# Patient Record
Sex: MALE | Race: WHITE | NOT HISPANIC OR LATINO | Employment: STUDENT | URBAN - METROPOLITAN AREA
[De-identification: names, ages, dates, MRNs, and addresses within clinical notes are randomized per-mention and may not be internally consistent; named-entity substitution may affect disease eponyms.]

---
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
## 2021-03-23 ENCOUNTER — HISTORICAL (OUTPATIENT)
Dept: ADMINISTRATIVE | Facility: HOSPITAL | Age: 21
End: 2021-03-23

## 2021-03-23 LAB
ABS NEUT (OLG): 3.23 X10(3)/MCL (ref 2.1–9.2)
ALBUMIN SERPL-MCNC: 4.6 GM/DL (ref 3.5–5)
ALBUMIN/GLOB SERPL: 2.2 RATIO (ref 1.1–2)
ALP SERPL-CCNC: 72 UNIT/L
ALT SERPL-CCNC: 16 UNIT/L (ref 0–55)
AST SERPL-CCNC: 18 UNIT/L (ref 5–34)
BASOPHILS # BLD AUTO: 0 X10(3)/MCL (ref 0–0.2)
BASOPHILS NFR BLD AUTO: 0 %
BILIRUB SERPL-MCNC: 1.5 MG/DL
BILIRUBIN DIRECT+TOT PNL SERPL-MCNC: 0.6 MG/DL (ref 0–0.5)
BILIRUBIN DIRECT+TOT PNL SERPL-MCNC: 0.9 MG/DL (ref 0–0.8)
BUN SERPL-MCNC: 10.1 MG/DL (ref 8.9–20.6)
CALCIUM SERPL-MCNC: 9.2 MG/DL (ref 8.4–10.2)
CHLORIDE SERPL-SCNC: 106 MMOL/L (ref 98–107)
CHOLEST SERPL-MCNC: 120 MG/DL
CHOLEST/HDLC SERPL: 3 {RATIO} (ref 0–5)
CO2 SERPL-SCNC: 30 MMOL/L (ref 22–29)
CREAT SERPL-MCNC: 0.83 MG/DL (ref 0.73–1.18)
EOSINOPHIL # BLD AUTO: 0.2 X10(3)/MCL (ref 0–0.9)
EOSINOPHIL NFR BLD AUTO: 3 %
ERYTHROCYTE [DISTWIDTH] IN BLOOD BY AUTOMATED COUNT: 11.8 % (ref 11.5–17)
EST. AVERAGE GLUCOSE BLD GHB EST-MCNC: 96.8 MG/DL
GLOBULIN SER-MCNC: 2.1 GM/DL (ref 2.4–3.5)
GLUCOSE SERPL-MCNC: 95 MG/DL (ref 74–100)
HBA1C MFR BLD: 5 %
HCT VFR BLD AUTO: 45.3 % (ref 42–52)
HDLC SERPL-MCNC: 46 MG/DL (ref 35–60)
HGB BLD-MCNC: 15.4 GM/DL (ref 14–18)
LDLC SERPL CALC-MCNC: 65 MG/DL (ref 50–140)
LYMPHOCYTES # BLD AUTO: 1.9 X10(3)/MCL (ref 0.6–4.6)
LYMPHOCYTES NFR BLD AUTO: 33 %
MCH RBC QN AUTO: 30.6 PG (ref 27–31)
MCHC RBC AUTO-ENTMCNC: 34 GM/DL (ref 33–36)
MCV RBC AUTO: 90.1 FL (ref 80–94)
MONOCYTES # BLD AUTO: 0.4 X10(3)/MCL (ref 0.1–1.3)
MONOCYTES NFR BLD AUTO: 7 %
NEUTROPHILS # BLD AUTO: 3.23 X10(3)/MCL (ref 2.1–9.2)
NEUTROPHILS NFR BLD AUTO: 56 %
PLATELET # BLD AUTO: 194 X10(3)/MCL (ref 130–400)
PMV BLD AUTO: 9.9 FL (ref 9.4–12.4)
POTASSIUM SERPL-SCNC: 4.6 MMOL/L (ref 3.5–5.1)
PROT SERPL-MCNC: 6.7 GM/DL (ref 6.4–8.3)
RBC # BLD AUTO: 5.03 X10(6)/MCL (ref 4.7–6.1)
SODIUM SERPL-SCNC: 141 MMOL/L (ref 136–145)
TRIGL SERPL-MCNC: 45 MG/DL (ref 34–140)
TSH SERPL-ACNC: 1.2 UIU/ML (ref 0.35–4.94)
VLDLC SERPL CALC-MCNC: 9 MG/DL
WBC # SPEC AUTO: 5.8 X10(3)/MCL (ref 4.5–11.5)

## 2022-04-11 ENCOUNTER — HISTORICAL (OUTPATIENT)
Dept: ADMINISTRATIVE | Facility: HOSPITAL | Age: 22
End: 2022-04-11
Payer: COMMERCIAL

## 2022-04-28 VITALS
WEIGHT: 171.94 LBS | SYSTOLIC BLOOD PRESSURE: 124 MMHG | DIASTOLIC BLOOD PRESSURE: 84 MMHG | HEIGHT: 72 IN | BODY MASS INDEX: 23.29 KG/M2 | OXYGEN SATURATION: 98 %

## 2022-05-16 ENCOUNTER — OFFICE VISIT (OUTPATIENT)
Dept: INTERNAL MEDICINE | Facility: CLINIC | Age: 22
End: 2022-05-16
Payer: COMMERCIAL

## 2022-05-16 VITALS
HEIGHT: 71 IN | SYSTOLIC BLOOD PRESSURE: 138 MMHG | HEART RATE: 71 BPM | BODY MASS INDEX: 25.09 KG/M2 | OXYGEN SATURATION: 98 % | TEMPERATURE: 98 F | DIASTOLIC BLOOD PRESSURE: 80 MMHG | WEIGHT: 179.19 LBS

## 2022-05-16 DIAGNOSIS — B36.0 TINEA VERSICOLOR: ICD-10-CM

## 2022-05-16 DIAGNOSIS — F90.0 ATTENTION DEFICIT HYPERACTIVITY DISORDER (ADHD), PREDOMINANTLY INATTENTIVE TYPE: Primary | ICD-10-CM

## 2022-05-16 DIAGNOSIS — F41.1 ANXIETY STATE: ICD-10-CM

## 2022-05-16 PROBLEM — E80.6 HYPERBILIRUBINEMIA: Status: ACTIVE | Noted: 2022-05-16

## 2022-05-16 PROBLEM — M54.50 LOW BACK PAIN: Status: ACTIVE | Noted: 2022-05-16

## 2022-05-16 PROBLEM — F90.9 ATTENTION DEFICIT HYPERACTIVITY DISORDER (ADHD): Status: ACTIVE | Noted: 2022-05-16

## 2022-05-16 PROCEDURE — 3008F BODY MASS INDEX DOCD: CPT | Mod: CPTII,,, | Performed by: INTERNAL MEDICINE

## 2022-05-16 PROCEDURE — 3075F SYST BP GE 130 - 139MM HG: CPT | Mod: CPTII,,, | Performed by: INTERNAL MEDICINE

## 2022-05-16 PROCEDURE — 1159F MED LIST DOCD IN RCRD: CPT | Mod: CPTII,,, | Performed by: INTERNAL MEDICINE

## 2022-05-16 PROCEDURE — 1160F RVW MEDS BY RX/DR IN RCRD: CPT | Mod: CPTII,,, | Performed by: INTERNAL MEDICINE

## 2022-05-16 PROCEDURE — 3079F DIAST BP 80-89 MM HG: CPT | Mod: CPTII,,, | Performed by: INTERNAL MEDICINE

## 2022-05-16 PROCEDURE — 3075F PR MOST RECENT SYSTOLIC BLOOD PRESS GE 130-139MM HG: ICD-10-PCS | Mod: CPTII,,, | Performed by: INTERNAL MEDICINE

## 2022-05-16 PROCEDURE — 99213 PR OFFICE/OUTPT VISIT, EST, LEVL III, 20-29 MIN: ICD-10-PCS | Mod: ,,, | Performed by: INTERNAL MEDICINE

## 2022-05-16 PROCEDURE — 1160F PR REVIEW ALL MEDS BY PRESCRIBER/CLIN PHARMACIST DOCUMENTED: ICD-10-PCS | Mod: CPTII,,, | Performed by: INTERNAL MEDICINE

## 2022-05-16 PROCEDURE — 1159F PR MEDICATION LIST DOCUMENTED IN MEDICAL RECORD: ICD-10-PCS | Mod: CPTII,,, | Performed by: INTERNAL MEDICINE

## 2022-05-16 PROCEDURE — 3079F PR MOST RECENT DIASTOLIC BLOOD PRESSURE 80-89 MM HG: ICD-10-PCS | Mod: CPTII,,, | Performed by: INTERNAL MEDICINE

## 2022-05-16 PROCEDURE — 99213 OFFICE O/P EST LOW 20 MIN: CPT | Mod: ,,, | Performed by: INTERNAL MEDICINE

## 2022-05-16 PROCEDURE — 3008F PR BODY MASS INDEX (BMI) DOCUMENTED: ICD-10-PCS | Mod: CPTII,,, | Performed by: INTERNAL MEDICINE

## 2022-05-16 RX ORDER — KETOCONAZOLE 20 MG/G
CREAM TOPICAL DAILY
Qty: 60 G | Refills: 2 | Status: SHIPPED | OUTPATIENT
Start: 2022-05-16 | End: 2022-08-16

## 2022-05-16 RX ORDER — DEXTROAMPHETAMINE SACCHARATE, AMPHETAMINE ASPARTATE, DEXTROAMPHETAMINE SULFATE AND AMPHETAMINE SULFATE 2.5; 2.5; 2.5; 2.5 MG/1; MG/1; MG/1; MG/1
10 TABLET ORAL DAILY
COMMUNITY
Start: 2022-01-13 | End: 2022-06-03 | Stop reason: SDUPTHER

## 2022-05-16 RX ORDER — DEXTROAMPHETAMINE SACCHARATE, AMPHETAMINE ASPARTATE MONOHYDRATE, DEXTROAMPHETAMINE SULFATE AND AMPHETAMINE SULFATE 3.75; 3.75; 3.75; 3.75 MG/1; MG/1; MG/1; MG/1
15 CAPSULE, EXTENDED RELEASE ORAL DAILY
COMMUNITY
Start: 2021-06-17 | End: 2022-06-03 | Stop reason: SDUPTHER

## 2022-05-16 RX ORDER — KETOCONAZOLE 20 MG/ML
SHAMPOO, SUSPENSION TOPICAL
Qty: 120 ML | Refills: 3 | Status: SHIPPED | OUTPATIENT
Start: 2022-05-16 | End: 2022-08-16

## 2022-05-16 NOTE — PROGRESS NOTES
Subjective:      Patient ID: Trey Slaughter is a 21 y.o. male.    Chief Complaint: No chief complaint on file.    Trey is a 21-year-old male presented for his ADHD.  Follow-up.  Blood pressure is mildly elevated in the 140 systolic range.  He just had a couple coughing right before the visit.  Advised that we will go ahead and refill his 15 mg XL for the funding feeling his Adderall 10 mg tablets until his blood pressure check next week.  Patient has noted some variations in blood pressure again at home.  Advised to refrain from excessive caffeine intake.  S S    The patient's Health Maintenance was reviewed and the following appears to be due at this time:   Health Maintenance Due   Topic Date Due    Hepatitis C Screening  Never done    HIV Screening  Never done    HPV Vaccines (2 - Male 3-dose series) 06/12/2019    COVID-19 Vaccine (3 - Booster for Pfizer series) 03/15/2022        Past Medical History:  Past Medical History:   Diagnosis Date    Anxiety state     Attention-deficit hyperactivity disorder, unspecified type     Hyperbilirubinemia     Low back pain     Pityriasis versicolor      Past Surgical History:   Procedure Laterality Date    Shoulder joint operation N/A      Review of patient's allergies indicates:  Not on File  Social History     Socioeconomic History    Marital status: Single     Family History   Problem Relation Age of Onset    Hypertension Mother        Review of Systems   Constitutional: Negative.         Alert, Oriented and in no acute distress    HENT: Negative for congestion, rhinorrhea, sinus pressure, sinus pain, sore throat and voice change.    Eyes: Negative for discharge and visual disturbance.   Respiratory: Negative for cough, chest tightness, shortness of breath and wheezing.    Cardiovascular: Negative for chest pain, palpitations and leg swelling.   Gastrointestinal: Negative for abdominal distention, abdominal pain, constipation, diarrhea, nausea and  vomiting.   Endocrine: Negative for cold intolerance, heat intolerance, polydipsia and polyuria.   Genitourinary: Negative for dysuria, flank pain, frequency and hematuria.   Musculoskeletal: Negative for arthralgias, back pain, gait problem, joint swelling, myalgias and neck pain.   Skin: Negative for rash.   Allergic/Immunologic: Negative for immunocompromised state.   Neurological: Negative for dizziness, seizures, weakness and headaches.   Psychiatric/Behavioral: Negative for agitation and suicidal ideas. The patient is not nervous/anxious.        Objective:   There were no vitals taken for this visit.    Physical Exam  Constitutional:       Appearance: Normal appearance.   HENT:      Head: Normocephalic and atraumatic.      Nose: Nose normal.      Mouth/Throat:      Mouth: Mucous membranes are moist.      Pharynx: Oropharynx is clear.   Eyes:      Extraocular Movements: Extraocular movements intact.      Pupils: Pupils are equal, round, and reactive to light.   Cardiovascular:      Rate and Rhythm: Normal rate and regular rhythm.      Pulses: Normal pulses.   Pulmonary:      Effort: Pulmonary effort is normal.      Breath sounds: Normal breath sounds.   Abdominal:      General: Bowel sounds are normal.      Palpations: Abdomen is soft.   Musculoskeletal:         General: Normal range of motion.      Cervical back: Normal range of motion and neck supple.   Skin:     General: Skin is warm.   Neurological:      General: No focal deficit present.      Mental Status: He is alert and oriented to person, place, and time. Mental status is at baseline.   Psychiatric:         Mood and Affect: Mood normal.       Assessment/ Plan:   1. Attention deficit hyperactivity disorder (ADHD), predominantly inattentive type  . On Adderall, continue  - No weight loss, seems to be doing well on the current dose   - blood pressure was mildly elevated, patient is advised to cut back on his caffeine intake    2. Anxiety state  - on  propranolol       No orders of the defined types were placed in this encounter.      Medication List with Changes/Refills   Current Medications    DEXTROAMPHETAMINE-AMPHETAMINE (ADDERALL) 10 MG TAB    Take 10 mg by mouth Daily.    PROPRANOLOL (INNOPRAN XL) 80 MG 24 HR CAPSULE    Take 80 mg by mouth Daily.      Medication List with Changes/Refills   Current Medications    DEXTROAMPHETAMINE-AMPHETAMINE (ADDERALL) 10 MG TAB    Take 10 mg by mouth Daily.       Start Date: 1/13/2022 End Date: --    PROPRANOLOL (INNOPRAN XL) 80 MG 24 HR CAPSULE    Take 80 mg by mouth Daily.       Start Date: 9/27/2021 End Date: --

## 2022-05-23 NOTE — TELEPHONE ENCOUNTER
----- Message from Dima Wilkins sent at 2022 10:54 AM CDT -----  Regarding: REFILL  MEDICATION REFILL REQUEST      PATIENT PHONE #:  [phone]     :  2000     PHARMACY:  [facility] [address]     PHARMACY PHONE #:      ALLERGIES: NKDA     MESSAGE  REFILL     1.) ADDERAL ER 15 MG     2.) ADDERAL 10 MG

## 2022-06-03 DIAGNOSIS — F90.0 ATTENTION DEFICIT HYPERACTIVITY DISORDER (ADHD), PREDOMINANTLY INATTENTIVE TYPE: ICD-10-CM

## 2022-06-03 RX ORDER — DEXTROAMPHETAMINE SACCHARATE, AMPHETAMINE ASPARTATE MONOHYDRATE, DEXTROAMPHETAMINE SULFATE AND AMPHETAMINE SULFATE 3.75; 3.75; 3.75; 3.75 MG/1; MG/1; MG/1; MG/1
15 CAPSULE, EXTENDED RELEASE ORAL DAILY
Qty: 30 CAPSULE | Refills: 0 | Status: SHIPPED | OUTPATIENT
Start: 2022-06-03 | End: 2022-07-06 | Stop reason: SDUPTHER

## 2022-06-03 RX ORDER — DEXTROAMPHETAMINE SACCHARATE, AMPHETAMINE ASPARTATE, DEXTROAMPHETAMINE SULFATE AND AMPHETAMINE SULFATE 2.5; 2.5; 2.5; 2.5 MG/1; MG/1; MG/1; MG/1
10 TABLET ORAL DAILY
Qty: 30 TABLET | Refills: 0 | Status: SHIPPED | OUTPATIENT
Start: 2022-06-03 | End: 2022-07-06 | Stop reason: SDUPTHER

## 2022-06-03 NOTE — TELEPHONE ENCOUNTER
----- Message from Kim Porter sent at 6/3/2022  8:59 AM CDT -----  Regardinth call for refills  Patient said he has called 4 times to try to get refills.  He needs 15 mg adderall extended release and 10 mg adderall regular. He uses [facility] his contact number is [phone]

## 2022-07-06 DIAGNOSIS — F90.0 ATTENTION DEFICIT HYPERACTIVITY DISORDER (ADHD), PREDOMINANTLY INATTENTIVE TYPE: ICD-10-CM

## 2022-07-06 RX ORDER — DEXTROAMPHETAMINE SACCHARATE, AMPHETAMINE ASPARTATE, DEXTROAMPHETAMINE SULFATE AND AMPHETAMINE SULFATE 2.5; 2.5; 2.5; 2.5 MG/1; MG/1; MG/1; MG/1
10 TABLET ORAL DAILY
Qty: 30 TABLET | Refills: 0 | Status: SHIPPED | OUTPATIENT
Start: 2022-07-06 | End: 2022-08-08 | Stop reason: SDUPTHER

## 2022-07-06 RX ORDER — DEXTROAMPHETAMINE SACCHARATE, AMPHETAMINE ASPARTATE MONOHYDRATE, DEXTROAMPHETAMINE SULFATE AND AMPHETAMINE SULFATE 3.75; 3.75; 3.75; 3.75 MG/1; MG/1; MG/1; MG/1
15 CAPSULE, EXTENDED RELEASE ORAL DAILY
Qty: 30 CAPSULE | Refills: 0 | Status: SHIPPED | OUTPATIENT
Start: 2022-07-06 | End: 2022-08-08

## 2022-08-08 DIAGNOSIS — F90.0 ATTENTION DEFICIT HYPERACTIVITY DISORDER (ADHD), PREDOMINANTLY INATTENTIVE TYPE: ICD-10-CM

## 2022-08-08 DIAGNOSIS — Z00.00 WELLNESS EXAMINATION: Primary | ICD-10-CM

## 2022-08-08 RX ORDER — DEXTROAMPHETAMINE SACCHARATE, AMPHETAMINE ASPARTATE, DEXTROAMPHETAMINE SULFATE AND AMPHETAMINE SULFATE 2.5; 2.5; 2.5; 2.5 MG/1; MG/1; MG/1; MG/1
10 TABLET ORAL DAILY
Qty: 30 TABLET | Refills: 0 | Status: SHIPPED | OUTPATIENT
Start: 2022-08-08 | End: 2022-08-16 | Stop reason: SDUPTHER

## 2022-08-08 NOTE — TELEPHONE ENCOUNTER
----- Message from Chelsy Gaytan sent at 2022  9:57 AM CDT -----  Regarding: refill  MEDICATION REFILL REQUEST      PATIENT PHONE #:[phone]     :2000     PHARMACY:[facility]     PHARMACY PHONE #: [phone]     ALLERGIES:nka     MESSAGE      Adderoll 10mg   qd

## 2022-08-12 ENCOUNTER — LAB VISIT (OUTPATIENT)
Dept: LAB | Facility: HOSPITAL | Age: 22
End: 2022-08-12
Attending: INTERNAL MEDICINE
Payer: COMMERCIAL

## 2022-08-12 DIAGNOSIS — Z00.00 WELLNESS EXAMINATION: ICD-10-CM

## 2022-08-12 LAB
ALBUMIN SERPL-MCNC: 4.3 GM/DL (ref 3.5–5)
ALBUMIN/GLOB SERPL: 2 RATIO (ref 1.1–2)
ALP SERPL-CCNC: 56 UNIT/L (ref 40–150)
ALT SERPL-CCNC: 11 UNIT/L (ref 0–55)
AST SERPL-CCNC: 16 UNIT/L (ref 5–34)
BASOPHILS # BLD AUTO: 0.04 X10(3)/MCL (ref 0–0.2)
BASOPHILS NFR BLD AUTO: 0.8 %
BILIRUBIN DIRECT+TOT PNL SERPL-MCNC: 2.1 MG/DL
BUN SERPL-MCNC: 10.4 MG/DL (ref 8.9–20.6)
CALCIUM SERPL-MCNC: 9.6 MG/DL (ref 8.4–10.2)
CHLORIDE SERPL-SCNC: 108 MMOL/L (ref 98–107)
CHOLEST SERPL-MCNC: 111 MG/DL
CHOLEST/HDLC SERPL: 3 {RATIO} (ref 0–5)
CO2 SERPL-SCNC: 28 MMOL/L (ref 22–29)
CREAT SERPL-MCNC: 0.78 MG/DL (ref 0.73–1.18)
EOSINOPHIL # BLD AUTO: 0.13 X10(3)/MCL (ref 0–0.9)
EOSINOPHIL NFR BLD AUTO: 2.4 %
ERYTHROCYTE [DISTWIDTH] IN BLOOD BY AUTOMATED COUNT: 11.9 % (ref 11.5–17)
EST. AVERAGE GLUCOSE BLD GHB EST-MCNC: 88.2 MG/DL
GFR SERPLBLD CREATININE-BSD FMLA CKD-EPI: >60 MLS/MIN/1.73/M2
GLOBULIN SER-MCNC: 2.1 GM/DL (ref 2.4–3.5)
GLUCOSE SERPL-MCNC: 95 MG/DL (ref 74–100)
HBA1C MFR BLD: 4.7 %
HCT VFR BLD AUTO: 43.2 % (ref 42–52)
HDLC SERPL-MCNC: 38 MG/DL (ref 35–60)
HGB BLD-MCNC: 14.6 GM/DL (ref 14–18)
IMM GRANULOCYTES # BLD AUTO: 0.01 X10(3)/MCL (ref 0–0.04)
IMM GRANULOCYTES NFR BLD AUTO: 0.2 %
LDLC SERPL CALC-MCNC: 64 MG/DL (ref 50–140)
LYMPHOCYTES # BLD AUTO: 2.13 X10(3)/MCL (ref 0.6–4.6)
LYMPHOCYTES NFR BLD AUTO: 40 %
MCH RBC QN AUTO: 31 PG (ref 27–31)
MCHC RBC AUTO-ENTMCNC: 33.8 MG/DL (ref 33–36)
MCV RBC AUTO: 91.7 FL (ref 80–94)
MONOCYTES # BLD AUTO: 0.52 X10(3)/MCL (ref 0.1–1.3)
MONOCYTES NFR BLD AUTO: 9.8 %
NEUTROPHILS # BLD AUTO: 2.5 X10(3)/MCL (ref 2.1–9.2)
NEUTROPHILS NFR BLD AUTO: 46.8 %
NRBC BLD AUTO-RTO: 0 %
PLATELET # BLD AUTO: 205 X10(3)/MCL (ref 130–400)
PMV BLD AUTO: 9.9 FL (ref 7.4–10.4)
POTASSIUM SERPL-SCNC: 4.3 MMOL/L (ref 3.5–5.1)
PROT SERPL-MCNC: 6.4 GM/DL (ref 6.4–8.3)
RBC # BLD AUTO: 4.71 X10(6)/MCL (ref 4.7–6.1)
SODIUM SERPL-SCNC: 140 MMOL/L (ref 136–145)
TRIGL SERPL-MCNC: 45 MG/DL (ref 34–140)
TSH SERPL-ACNC: 1.41 UIU/ML (ref 0.35–4.94)
VLDLC SERPL CALC-MCNC: 9 MG/DL
WBC # SPEC AUTO: 5.3 X10(3)/MCL (ref 4.5–11.5)

## 2022-08-12 PROCEDURE — 83036 HEMOGLOBIN GLYCOSYLATED A1C: CPT

## 2022-08-12 PROCEDURE — 84443 ASSAY THYROID STIM HORMONE: CPT

## 2022-08-12 PROCEDURE — 80053 COMPREHEN METABOLIC PANEL: CPT

## 2022-08-12 PROCEDURE — 80061 LIPID PANEL: CPT

## 2022-08-12 PROCEDURE — 85025 COMPLETE CBC W/AUTO DIFF WBC: CPT

## 2022-08-12 PROCEDURE — 36415 COLL VENOUS BLD VENIPUNCTURE: CPT

## 2022-08-16 ENCOUNTER — OFFICE VISIT (OUTPATIENT)
Dept: INTERNAL MEDICINE | Facility: CLINIC | Age: 22
End: 2022-08-16
Payer: COMMERCIAL

## 2022-08-16 VITALS
BODY MASS INDEX: 24.45 KG/M2 | DIASTOLIC BLOOD PRESSURE: 68 MMHG | WEIGHT: 174.63 LBS | SYSTOLIC BLOOD PRESSURE: 124 MMHG | HEART RATE: 89 BPM | OXYGEN SATURATION: 98 % | TEMPERATURE: 99 F | HEIGHT: 71 IN

## 2022-08-16 DIAGNOSIS — Z00.00 WELLNESS EXAMINATION: ICD-10-CM

## 2022-08-16 DIAGNOSIS — F90.0 ATTENTION DEFICIT HYPERACTIVITY DISORDER (ADHD), PREDOMINANTLY INATTENTIVE TYPE: Primary | ICD-10-CM

## 2022-08-16 DIAGNOSIS — F41.1 ANXIETY STATE: ICD-10-CM

## 2022-08-16 PROCEDURE — 3078F DIAST BP <80 MM HG: CPT | Mod: CPTII,,, | Performed by: INTERNAL MEDICINE

## 2022-08-16 PROCEDURE — 3074F PR MOST RECENT SYSTOLIC BLOOD PRESSURE < 130 MM HG: ICD-10-PCS | Mod: CPTII,,, | Performed by: INTERNAL MEDICINE

## 2022-08-16 PROCEDURE — 1159F PR MEDICATION LIST DOCUMENTED IN MEDICAL RECORD: ICD-10-PCS | Mod: CPTII,,, | Performed by: INTERNAL MEDICINE

## 2022-08-16 PROCEDURE — 3008F BODY MASS INDEX DOCD: CPT | Mod: CPTII,,, | Performed by: INTERNAL MEDICINE

## 2022-08-16 PROCEDURE — 3078F PR MOST RECENT DIASTOLIC BLOOD PRESSURE < 80 MM HG: ICD-10-PCS | Mod: CPTII,,, | Performed by: INTERNAL MEDICINE

## 2022-08-16 PROCEDURE — 1159F MED LIST DOCD IN RCRD: CPT | Mod: CPTII,,, | Performed by: INTERNAL MEDICINE

## 2022-08-16 PROCEDURE — 3008F PR BODY MASS INDEX (BMI) DOCUMENTED: ICD-10-PCS | Mod: CPTII,,, | Performed by: INTERNAL MEDICINE

## 2022-08-16 PROCEDURE — 99395 PR PREVENTIVE VISIT,EST,18-39: ICD-10-PCS | Mod: ,,, | Performed by: INTERNAL MEDICINE

## 2022-08-16 PROCEDURE — 99395 PREV VISIT EST AGE 18-39: CPT | Mod: ,,, | Performed by: INTERNAL MEDICINE

## 2022-08-16 PROCEDURE — 3074F SYST BP LT 130 MM HG: CPT | Mod: CPTII,,, | Performed by: INTERNAL MEDICINE

## 2022-08-16 RX ORDER — DEXTROAMPHETAMINE SACCHARATE, AMPHETAMINE ASPARTATE, DEXTROAMPHETAMINE SULFATE AND AMPHETAMINE SULFATE 2.5; 2.5; 2.5; 2.5 MG/1; MG/1; MG/1; MG/1
10 TABLET ORAL DAILY
Qty: 30 TABLET | Refills: 0 | Status: SHIPPED | OUTPATIENT
Start: 2022-08-16 | End: 2022-08-16 | Stop reason: SDUPTHER

## 2022-08-16 RX ORDER — DEXTROAMPHETAMINE SACCHARATE, AMPHETAMINE ASPARTATE, DEXTROAMPHETAMINE SULFATE AND AMPHETAMINE SULFATE 2.5; 2.5; 2.5; 2.5 MG/1; MG/1; MG/1; MG/1
10 TABLET ORAL DAILY
Qty: 30 TABLET | Refills: 0 | Status: SHIPPED | OUTPATIENT
Start: 2022-08-16 | End: 2022-09-09 | Stop reason: SDUPTHER

## 2022-08-16 NOTE — ASSESSMENT & PLAN NOTE
-labs are reviewed all essentially normal  -patient is advised on importance of watching her carbohydrate intake and saturated fat intake, making the right nutritional choices and exercising on a regular basis  -up-to-date with the screening

## 2022-08-16 NOTE — PROGRESS NOTES
Subjective:      Patient ID: Trey Slaughter is a 21 y.o. male.    Chief Complaint: Annual Exam (Wellness)    Trey is a 21-year-old male who is here today for his wellness visit and ADHD medication refills.  Comorbidities include anxiety issues, ADHD.  On propranolol which will be continued .  Patient would like to switch to short-acting Adderall twice a day instead of the morning dose of the extended-release Adderall.  We will go ahead and change it to 10 mg p.o. b.i.d.   Wellness labs are reviewed and are noted to be all essentially normal.    The patient's Health Maintenance was reviewed and the following appears to be due at this time:   There are no preventive care reminders to display for this patient.     Past Medical History:  Past Medical History:   Diagnosis Date    Anxiety state     Attention-deficit hyperactivity disorder, unspecified type     Hyperbilirubinemia     Low back pain     Pityriasis versicolor      Past Surgical History:   Procedure Laterality Date    Shoulder joint operation N/A      Review of patient's allergies indicates:  No Known Allergies  Social History     Socioeconomic History    Marital status: Single   Tobacco Use    Smoking status: Never Smoker    Smokeless tobacco: Never Used   Substance and Sexual Activity    Alcohol use: Not Currently    Drug use: Not Currently     Family History   Problem Relation Age of Onset    Hypertension Mother        Review of Systems   Constitutional: Negative for activity change, appetite change and fatigue.   HENT: Negative for postnasal drip, rhinorrhea, sinus pain and sore throat.    Eyes: Negative for visual disturbance.   Respiratory: Negative for cough, shortness of breath and wheezing.    Cardiovascular: Negative for chest pain and palpitations.   Gastrointestinal: Negative for abdominal distention, blood in stool, constipation, diarrhea, nausea and vomiting.   Genitourinary: Negative for dysuria, flank pain, frequency and  "hematuria.   Musculoskeletal: Negative for arthralgias, back pain and joint swelling.   Skin: Negative for rash and wound.   Neurological: Negative for dizziness, seizures, weakness and headaches.   Psychiatric/Behavioral: Negative for agitation and suicidal ideas.       Objective:   /68 (BP Location: Left arm, Patient Position: Sitting, BP Method: Small (Manual))   Pulse 89   Temp 99 °F (37.2 °C) (Temporal)   Ht 5' 11" (1.803 m)   Wt 79.2 kg (174 lb 9.6 oz)   SpO2 98%   BMI 24.35 kg/m²     Physical Exam  Constitutional:       Appearance: Normal appearance.   HENT:      Head: Normocephalic and atraumatic.      Nose: Nose normal.      Mouth/Throat:      Mouth: Mucous membranes are moist.      Pharynx: Oropharynx is clear.   Eyes:      Extraocular Movements: Extraocular movements intact.      Pupils: Pupils are equal, round, and reactive to light.   Cardiovascular:      Rate and Rhythm: Normal rate and regular rhythm.      Pulses: Normal pulses.   Pulmonary:      Effort: Pulmonary effort is normal.      Breath sounds: Normal breath sounds.   Abdominal:      General: Bowel sounds are normal.      Palpations: Abdomen is soft.   Musculoskeletal:         General: Normal range of motion.      Cervical back: Normal range of motion and neck supple.   Skin:     General: Skin is warm.   Neurological:      General: No focal deficit present.      Mental Status: He is alert and oriented to person, place, and time. Mental status is at baseline.   Psychiatric:         Mood and Affect: Mood normal.      Comments:  The mood appears not anxious and the affect is not angry, not blunt, not labile and not inappropriate. Speech is not rapid and/or pressured, not delayed, not tangential and not slurred. The patient is not agitated, not aggressive, is not hyperactive, not slowed, not withdrawn, not actively hallucinating and not combative. Thought content is not paranoid and not delusional. Cognition and memory are not impaired. " The patient does not express impulsivity or inappropriate judgment and does not exhibit a depressed mood. The patient expresses no homicidal and no suicidal ideation and has no suicidal plans and no homicidal plans. The patient is communicative and exhibits a normal recent memory and normal remote memory. The patient is attentive.            Assessment/ Plan:     Problem List Items Addressed This Visit        Psychiatric    Attention deficit hyperactivity disorder (ADHD) - Primary     -continue Adderall, seems to be tolerating it well without any issues  -switching the dose to 10 mg p.o. b.i.d.           Relevant Medications    dextroamphetamine-amphetamine 10 mg Tab    Anxiety state     -propranolol 80 mg extended release daily, seems to be doing well              Other    Wellness examination     -labs are reviewed all essentially normal  -patient is advised on importance of watching her carbohydrate intake and saturated fat intake, making the right nutritional choices and exercising on a regular basis  -up-to-date with the screening                 No orders of the defined types were placed in this encounter.      Medication List with Changes/Refills   Current Medications    PROPRANOLOL (INNOPRAN XL) 80 MG 24 HR CAPSULE    Take 80 mg by mouth Daily.   Changed and/or Refilled Medications    Modified Medication Previous Medication    DEXTROAMPHETAMINE-AMPHETAMINE 10 MG TAB dextroamphetamine-amphetamine 10 mg Tab       Take 1 tablet (10 mg total) by mouth Daily.    Take 1 tablet (10 mg total) by mouth Daily.   Discontinued Medications    KETOCONAZOLE (NIZORAL) 2 % CREAM    Apply topically once daily.    KETOCONAZOLE (NIZORAL) 2 % SHAMPOO    Apply topically twice a week.      Medication List with Changes/Refills   Current Medications    PROPRANOLOL (INNOPRAN XL) 80 MG 24 HR CAPSULE    Take 80 mg by mouth Daily.       Start Date: 9/27/2021 End Date: --   Changed and/or Refilled Medications    Modified Medication  Previous Medication    DEXTROAMPHETAMINE-AMPHETAMINE 10 MG TAB dextroamphetamine-amphetamine 10 mg Tab       Take 1 tablet (10 mg total) by mouth Daily.    Take 1 tablet (10 mg total) by mouth Daily.       Start Date: 8/16/2022 End Date: --    Start Date: 8/8/2022  End Date: 8/16/2022   Discontinued Medications    KETOCONAZOLE (NIZORAL) 2 % CREAM    Apply topically once daily.       Start Date: 5/16/2022 End Date: 8/16/2022    KETOCONAZOLE (NIZORAL) 2 % SHAMPOO    Apply topically twice a week.       Start Date: 5/16/2022 End Date: 8/16/2022

## 2022-08-16 NOTE — ASSESSMENT & PLAN NOTE
-continue Adderall, seems to be tolerating it well without any issues  -switching the dose to 10 mg p.o. b.i.d.

## 2022-09-09 DIAGNOSIS — F90.0 ATTENTION DEFICIT HYPERACTIVITY DISORDER (ADHD), PREDOMINANTLY INATTENTIVE TYPE: ICD-10-CM

## 2022-09-09 RX ORDER — DEXTROAMPHETAMINE SACCHARATE, AMPHETAMINE ASPARTATE, DEXTROAMPHETAMINE SULFATE AND AMPHETAMINE SULFATE 2.5; 2.5; 2.5; 2.5 MG/1; MG/1; MG/1; MG/1
10 TABLET ORAL 2 TIMES DAILY
Qty: 60 TABLET | Refills: 0 | Status: SHIPPED | OUTPATIENT
Start: 2022-09-09 | End: 2022-10-17 | Stop reason: SDUPTHER

## 2022-10-17 DIAGNOSIS — F90.0 ATTENTION DEFICIT HYPERACTIVITY DISORDER (ADHD), PREDOMINANTLY INATTENTIVE TYPE: ICD-10-CM

## 2022-10-17 NOTE — TELEPHONE ENCOUNTER
----- Message from Chelsy Gaytan sent at 10/17/2022 10:00 AM CDT -----  Regarding: refill  MEDICATION REFILL REQUEST      PATIENT PHONE #:[phone]     :[birth year]     [facility]     PHARMACY PHONE #: [phone]     ALLERGIES:     MESSAGE    Generic adderall 10 mg              bid

## 2022-10-18 RX ORDER — DEXTROAMPHETAMINE SACCHARATE, AMPHETAMINE ASPARTATE, DEXTROAMPHETAMINE SULFATE AND AMPHETAMINE SULFATE 2.5; 2.5; 2.5; 2.5 MG/1; MG/1; MG/1; MG/1
10 TABLET ORAL 2 TIMES DAILY
Qty: 60 TABLET | Refills: 0 | Status: SHIPPED | OUTPATIENT
Start: 2022-10-18 | End: 2022-11-15 | Stop reason: SDUPTHER

## 2022-11-09 ENCOUNTER — TELEPHONE (OUTPATIENT)
Dept: INTERNAL MEDICINE | Facility: CLINIC | Age: 22
End: 2022-11-09
Payer: COMMERCIAL

## 2022-11-15 ENCOUNTER — OFFICE VISIT (OUTPATIENT)
Dept: INTERNAL MEDICINE | Facility: CLINIC | Age: 22
End: 2022-11-15
Payer: COMMERCIAL

## 2022-11-15 VITALS
OXYGEN SATURATION: 99 % | HEIGHT: 71 IN | TEMPERATURE: 97 F | HEART RATE: 77 BPM | BODY MASS INDEX: 24.36 KG/M2 | SYSTOLIC BLOOD PRESSURE: 127 MMHG | WEIGHT: 174 LBS | DIASTOLIC BLOOD PRESSURE: 84 MMHG

## 2022-11-15 DIAGNOSIS — F90.0 ATTENTION DEFICIT HYPERACTIVITY DISORDER (ADHD), PREDOMINANTLY INATTENTIVE TYPE: ICD-10-CM

## 2022-11-15 PROBLEM — F90.9 ATTENTION DEFICIT HYPERACTIVITY DISORDER (ADHD): Chronic | Status: ACTIVE | Noted: 2022-05-16

## 2022-11-15 PROCEDURE — 3008F PR BODY MASS INDEX (BMI) DOCUMENTED: ICD-10-PCS | Mod: CPTII,,,

## 2022-11-15 PROCEDURE — 1160F RVW MEDS BY RX/DR IN RCRD: CPT | Mod: CPTII,,,

## 2022-11-15 PROCEDURE — 3074F PR MOST RECENT SYSTOLIC BLOOD PRESSURE < 130 MM HG: ICD-10-PCS | Mod: CPTII,,,

## 2022-11-15 PROCEDURE — 1160F PR REVIEW ALL MEDS BY PRESCRIBER/CLIN PHARMACIST DOCUMENTED: ICD-10-PCS | Mod: CPTII,,,

## 2022-11-15 PROCEDURE — 3079F PR MOST RECENT DIASTOLIC BLOOD PRESSURE 80-89 MM HG: ICD-10-PCS | Mod: CPTII,,,

## 2022-11-15 PROCEDURE — 3074F SYST BP LT 130 MM HG: CPT | Mod: CPTII,,,

## 2022-11-15 PROCEDURE — 3079F DIAST BP 80-89 MM HG: CPT | Mod: CPTII,,,

## 2022-11-15 PROCEDURE — 3008F BODY MASS INDEX DOCD: CPT | Mod: CPTII,,,

## 2022-11-15 PROCEDURE — 1159F MED LIST DOCD IN RCRD: CPT | Mod: CPTII,,,

## 2022-11-15 PROCEDURE — 1159F PR MEDICATION LIST DOCUMENTED IN MEDICAL RECORD: ICD-10-PCS | Mod: CPTII,,,

## 2022-11-15 PROCEDURE — 99213 PR OFFICE/OUTPT VISIT, EST, LEVL III, 20-29 MIN: ICD-10-PCS | Mod: ,,,

## 2022-11-15 PROCEDURE — 99213 OFFICE O/P EST LOW 20 MIN: CPT | Mod: ,,,

## 2022-11-15 RX ORDER — DEXTROAMPHETAMINE SACCHARATE, AMPHETAMINE ASPARTATE, DEXTROAMPHETAMINE SULFATE AND AMPHETAMINE SULFATE 2.5; 2.5; 2.5; 2.5 MG/1; MG/1; MG/1; MG/1
10 TABLET ORAL 2 TIMES DAILY
Qty: 60 TABLET | Refills: 0 | Status: SHIPPED | OUTPATIENT
Start: 2022-11-18 | End: 2022-12-28 | Stop reason: SDUPTHER

## 2022-11-15 NOTE — ASSESSMENT & PLAN NOTE
-currently on Adderall 10 mg b.i.d., continue  -doing well on instead release formulary with improved nighttime jitter/trouble sleeping trouble symptoms  -follow-up q.3 months for refills

## 2022-11-15 NOTE — PROGRESS NOTES
Patient ID: Trey Slaughter is a 22 y.o. male.    Chief Complaint: Annual Exam    22-year-old male here today for a three-month follow-up visit.  Medical comorbidities include anxiety, ADHD.  Today reports has been doing fairly well with propanolol for his anxiety.  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  Previously on extended-release Adderall formulary, however switched to and stent release formulary due to afternoon jitter/trouble sleeping.  Immunizations discussed, however patient wishing to hold off on influenza vaccine.  Otherwise patient is fairly well without any acute medical concerns today.    Wellness: 8/16/22      MEDICAL HISTORY:    Past Medical History:   Diagnosis Date    Anxiety state     Attention-deficit hyperactivity disorder, unspecified type     Hyperbilirubinemia     Low back pain     Pityriasis versicolor       Past Surgical History:   Procedure Laterality Date    Shoulder joint operation N/A       Social History     Tobacco Use    Smoking status: Never    Smokeless tobacco: Never   Substance Use Topics    Alcohol use: Not Currently    Drug use: Not Currently          Health Maintenance Due   Topic Date Due    Hepatitis C Screening  Never done    Influenza Vaccine (1) Never done          Patient Care Team:  Shahida Blum MD as PCP - General (Internal Medicine)      Review of Systems   Constitutional:  Negative for fatigue and fever.   HENT:  Negative for congestion, rhinorrhea, sore throat and trouble swallowing.    Eyes:  Negative for redness and visual disturbance.   Respiratory:  Negative for cough, chest tightness and shortness of breath.    Cardiovascular:  Negative for chest pain and palpitations.   Gastrointestinal:  Negative for abdominal pain, constipation, diarrhea, nausea and vomiting.   Genitourinary:  Negative for dysuria, flank pain, frequency and urgency.   Musculoskeletal:   "Negative for arthralgias, gait problem and myalgias.   Skin:  Negative for rash and wound.   Neurological:  Negative for facial asymmetry, speech difficulty, weakness and headaches.   All other systems reviewed and are negative.    Objective:   /84 (BP Location: Right arm, Patient Position: Sitting, BP Method: Large (Automatic))   Pulse 77   Temp 97.3 °F (36.3 °C)   Ht 5' 11" (1.803 m)   Wt 78.9 kg (174 lb)   SpO2 99%   BMI 24.27 kg/m²      Physical Exam  Constitutional:       General: He is not in acute distress.     Appearance: Normal appearance.   HENT:      Right Ear: Tympanic membrane, ear canal and external ear normal.      Left Ear: Tympanic membrane, ear canal and external ear normal.      Nose: Nose normal.      Mouth/Throat:      Mouth: Mucous membranes are moist.      Pharynx: Oropharynx is clear.   Eyes:      Extraocular Movements: Extraocular movements intact.      Conjunctiva/sclera: Conjunctivae normal.      Pupils: Pupils are equal, round, and reactive to light.   Cardiovascular:      Rate and Rhythm: Normal rate and regular rhythm.      Pulses: Normal pulses.      Heart sounds: Normal heart sounds. No murmur heard.    No gallop.   Pulmonary:      Effort: Pulmonary effort is normal.      Breath sounds: Normal breath sounds. No wheezing.   Abdominal:      General: Bowel sounds are normal. There is no distension.      Palpations: Abdomen is soft. There is no mass.      Tenderness: There is no abdominal tenderness. There is no guarding.   Musculoskeletal:         General: Normal range of motion.   Skin:     General: Skin is warm and dry.   Neurological:      Mental Status: He is alert. Mental status is at baseline.      Sensory: No sensory deficit.      Motor: No weakness.         Assessment:       ICD-10-CM ICD-9-CM   1. Attention deficit hyperactivity disorder (ADHD), predominantly inattentive type  F90.0 314.00        Plan:     Problem List Items Addressed This Visit          Psychiatric "    Attention deficit hyperactivity disorder (ADHD) (Chronic)     -currently on Adderall 10 mg b.i.d., continue  -doing well on instead release formulary with improved nighttime jitter/trouble sleeping trouble symptoms  -follow-up q.3 months for refills         Relevant Medications    dextroamphetamine-amphetamine 10 mg Tab (Start on 11/18/2022)          Follow up in 3 months (on 2/15/2023) for ADD.   -plan specifics discussed above    Orders Placed This Encounter    dextroamphetamine-amphetamine 10 mg Tab        Medication List with Changes/Refills   Current Medications    PROPRANOLOL (INDERAL LA) 80 MG 24 HR CAPSULE    TAKE 1 CAPSULE BY MOUTH EVERY DAY    PROPRANOLOL (INNOPRAN XL) 80 MG 24 HR CAPSULE    Take 80 mg by mouth Daily.   Changed and/or Refilled Medications    Modified Medication Previous Medication    DEXTROAMPHETAMINE-AMPHETAMINE 10 MG TAB dextroamphetamine-amphetamine 10 mg Tab       Take 1 tablet (10 mg total) by mouth 2 (two) times a day.    Take 1 tablet (10 mg total) by mouth 2 (two) times a day.

## 2022-11-21 ENCOUNTER — TELEPHONE (OUTPATIENT)
Dept: INTERNAL MEDICINE | Facility: CLINIC | Age: 22
End: 2022-11-21
Payer: COMMERCIAL

## 2022-11-21 PROBLEM — Z00.00 WELLNESS EXAMINATION: Status: RESOLVED | Noted: 2022-08-16 | Resolved: 2022-11-21

## 2022-11-21 NOTE — TELEPHONE ENCOUNTER
----- Message from Cherie Garcia sent at 2022  8:56 AM CST -----  Regarding: Medication Refill  MEDICATION REFILL REQUEST      PATIENT PHONE #: [phone]     :     [facility]ACY: [facility] [address]     PHARMACY PHONE #:      ALLERGIES:     MESSAGE Pt called and stated he needs a refill on his Adderall 20mg sent to the pharmacy.. please advise

## 2022-12-28 DIAGNOSIS — F90.0 ATTENTION DEFICIT HYPERACTIVITY DISORDER (ADHD), PREDOMINANTLY INATTENTIVE TYPE: ICD-10-CM

## 2022-12-28 RX ORDER — DEXTROAMPHETAMINE SACCHARATE, AMPHETAMINE ASPARTATE, DEXTROAMPHETAMINE SULFATE AND AMPHETAMINE SULFATE 2.5; 2.5; 2.5; 2.5 MG/1; MG/1; MG/1; MG/1
10 TABLET ORAL 2 TIMES DAILY
Qty: 60 TABLET | Refills: 0 | Status: SHIPPED | OUTPATIENT
Start: 2022-12-28 | End: 2023-01-30 | Stop reason: SDUPTHER

## 2022-12-28 NOTE — TELEPHONE ENCOUNTER
----- Message from Kim Porter sent at 12/28/2022  9:18 AM CST -----  Regarding: refill  Needs adderral 10 mg refilled at [facility]

## 2023-01-30 DIAGNOSIS — F90.0 ATTENTION DEFICIT HYPERACTIVITY DISORDER (ADHD), PREDOMINANTLY INATTENTIVE TYPE: ICD-10-CM

## 2023-01-30 RX ORDER — DEXTROAMPHETAMINE SACCHARATE, AMPHETAMINE ASPARTATE, DEXTROAMPHETAMINE SULFATE AND AMPHETAMINE SULFATE 2.5; 2.5; 2.5; 2.5 MG/1; MG/1; MG/1; MG/1
10 TABLET ORAL 2 TIMES DAILY
Qty: 60 TABLET | Refills: 0 | Status: SHIPPED | OUTPATIENT
Start: 2023-01-30 | End: 2023-02-27 | Stop reason: SDUPTHER

## 2023-01-30 NOTE — TELEPHONE ENCOUNTER
----- Message from Cherie Garcia sent at 2023 11:01 AM CST -----  Regarding: Medication refill  MEDICATION REFILL REQUEST      PATIENT PHONE #: [phone]     :     [facility] [address]      PHARMACY PHONE #:      ALLERGIES:     MESSAGE Pt called and stated he needs a refill on his Adderall 10mg [place] 60 tablets.. please advise

## 2023-02-20 ENCOUNTER — TELEPHONE (OUTPATIENT)
Dept: ADMINISTRATIVE | Facility: HOSPITAL | Age: 23
End: 2023-02-20
Payer: COMMERCIAL

## 2023-02-27 ENCOUNTER — OFFICE VISIT (OUTPATIENT)
Dept: INTERNAL MEDICINE | Facility: CLINIC | Age: 23
End: 2023-02-27
Payer: COMMERCIAL

## 2023-02-27 VITALS
HEIGHT: 71 IN | SYSTOLIC BLOOD PRESSURE: 134 MMHG | WEIGHT: 175.81 LBS | DIASTOLIC BLOOD PRESSURE: 82 MMHG | TEMPERATURE: 99 F | HEART RATE: 72 BPM | BODY MASS INDEX: 24.61 KG/M2 | OXYGEN SATURATION: 99 %

## 2023-02-27 DIAGNOSIS — F90.0 ATTENTION DEFICIT HYPERACTIVITY DISORDER (ADHD), PREDOMINANTLY INATTENTIVE TYPE: ICD-10-CM

## 2023-02-27 DIAGNOSIS — F41.1 ANXIETY STATE: ICD-10-CM

## 2023-02-27 DIAGNOSIS — F84.0 AUTISM SPECTRUM DISORDER: Primary | ICD-10-CM

## 2023-02-27 PROCEDURE — 99214 OFFICE O/P EST MOD 30 MIN: CPT | Mod: ,,, | Performed by: INTERNAL MEDICINE

## 2023-02-27 PROCEDURE — 3075F SYST BP GE 130 - 139MM HG: CPT | Mod: CPTII,,, | Performed by: INTERNAL MEDICINE

## 2023-02-27 PROCEDURE — 3008F BODY MASS INDEX DOCD: CPT | Mod: CPTII,,, | Performed by: INTERNAL MEDICINE

## 2023-02-27 PROCEDURE — 1159F PR MEDICATION LIST DOCUMENTED IN MEDICAL RECORD: ICD-10-PCS | Mod: CPTII,,, | Performed by: INTERNAL MEDICINE

## 2023-02-27 PROCEDURE — 3008F PR BODY MASS INDEX (BMI) DOCUMENTED: ICD-10-PCS | Mod: CPTII,,, | Performed by: INTERNAL MEDICINE

## 2023-02-27 PROCEDURE — 1160F RVW MEDS BY RX/DR IN RCRD: CPT | Mod: CPTII,,, | Performed by: INTERNAL MEDICINE

## 2023-02-27 PROCEDURE — 3075F PR MOST RECENT SYSTOLIC BLOOD PRESS GE 130-139MM HG: ICD-10-PCS | Mod: CPTII,,, | Performed by: INTERNAL MEDICINE

## 2023-02-27 PROCEDURE — 99214 PR OFFICE/OUTPT VISIT, EST, LEVL IV, 30-39 MIN: ICD-10-PCS | Mod: ,,, | Performed by: INTERNAL MEDICINE

## 2023-02-27 PROCEDURE — 1160F PR REVIEW ALL MEDS BY PRESCRIBER/CLIN PHARMACIST DOCUMENTED: ICD-10-PCS | Mod: CPTII,,, | Performed by: INTERNAL MEDICINE

## 2023-02-27 PROCEDURE — 1159F MED LIST DOCD IN RCRD: CPT | Mod: CPTII,,, | Performed by: INTERNAL MEDICINE

## 2023-02-27 PROCEDURE — 3079F PR MOST RECENT DIASTOLIC BLOOD PRESSURE 80-89 MM HG: ICD-10-PCS | Mod: CPTII,,, | Performed by: INTERNAL MEDICINE

## 2023-02-27 PROCEDURE — 3079F DIAST BP 80-89 MM HG: CPT | Mod: CPTII,,, | Performed by: INTERNAL MEDICINE

## 2023-02-27 RX ORDER — DEXTROAMPHETAMINE SACCHARATE, AMPHETAMINE ASPARTATE, DEXTROAMPHETAMINE SULFATE AND AMPHETAMINE SULFATE 2.5; 2.5; 2.5; 2.5 MG/1; MG/1; MG/1; MG/1
1 TABLET ORAL 2 TIMES DAILY
Qty: 60 TABLET | Refills: 0 | Status: SHIPPED | OUTPATIENT
Start: 2023-04-02 | End: 2023-03-06 | Stop reason: SDUPTHER

## 2023-02-27 RX ORDER — DEXTROAMPHETAMINE SACCHARATE, AMPHETAMINE ASPARTATE, DEXTROAMPHETAMINE SULFATE AND AMPHETAMINE SULFATE 2.5; 2.5; 2.5; 2.5 MG/1; MG/1; MG/1; MG/1
10 TABLET ORAL 2 TIMES DAILY
Qty: 60 TABLET | Refills: 0 | Status: SHIPPED | OUTPATIENT
Start: 2023-05-03 | End: 2023-05-23 | Stop reason: SDUPTHER

## 2023-02-27 RX ORDER — DEXTROAMPHETAMINE SACCHARATE, AMPHETAMINE ASPARTATE, DEXTROAMPHETAMINE SULFATE AND AMPHETAMINE SULFATE 2.5; 2.5; 2.5; 2.5 MG/1; MG/1; MG/1; MG/1
1 TABLET ORAL 2 TIMES DAILY
Qty: 60 TABLET | Refills: 0 | Status: SHIPPED | OUTPATIENT
Start: 2023-03-02 | End: 2023-03-06

## 2023-02-27 NOTE — PROGRESS NOTES
CC:Attention Deficit Disorder  HPI:  22-year-old male here today for a three-month follow-up visit.  Medical comorbidities include anxiety, ADHD.   seems to be doing well with the propranolol with the anxiety however still insistent that he does have adult-onset autism and would like to be evaluated for it.  Has seen counselors at his [employer] without much benefit.  We discussed sending him on formal referral to Psychiatry and he is in agreement        Wellness: 8/16/22  Problem List Items Addressed This Visit          Psychiatric    Attention deficit hyperactivity disorder (ADHD) (Chronic)    Relevant Medications    dextroamphetamine-amphetamine 10 mg Tab (Start on 5/3/2023)    Other Relevant Orders    Ambulatory referral/consult to Psychiatry    Anxiety state    Current Assessment & Plan     -Continue propranolol 80 mg p.o. daily   -patient does have lot of anxiety about possibly having autism and would like to get formally evaluated  -sending a referral to Psychiatry since just meeting with [facility] counselors is  not seeming to help him much          Other Visit Diagnoses       Autism spectrum disorder    -  Primary    Relevant Orders    Ambulatory referral/consult to Psychiatry            The patient's Health Maintenance was reviewed and the following appears to be due at this time:  Health Maintenance Due   Topic Date Due    Influenza Vaccine (1) Never done       [unfilled]  Outpatient Medications Prior to Visit   Medication Sig Dispense Refill    propranoloL (INDERAL LA) 80 MG 24 hr capsule TAKE 1 CAPSULE BY MOUTH EVERY DAY 90 capsule 1    dextroamphetamine-amphetamine 10 mg Tab Take 1 tablet (10 mg total) by mouth 2 (two) times a day. 60 tablet 0    propranoloL (INNOPRAN XL) 80 mg 24 hr capsule Take 80 mg by mouth Daily.       No facility-administered medications prior to visit.        Physical Exam   /82 (BP Location: Left arm, Patient Position: Sitting, BP Method: Small (Manual))   Pulse 72    "Temp 99.3 °F (37.4 °C) (Temporal)   Ht 5' 11" (1.803 m)   Wt 79.7 kg (175 lb 12.8 oz)   SpO2 99%   BMI 24.52 kg/m²   Constitutional: The patient appears well-developed and well-nourished. No distress.   Skin: Not diaphoretic.   Psychiatric: The mood appears not anxious and the affect is not angry, not blunt, not labile and not inappropriate. Speech is not rapid and/or pressured, not delayed, not tangential and not slurred. The patient is not agitated, not aggressive, is not hyperactive, not slowed, not withdrawn, not actively hallucinating and not combative. Thought content is not paranoid and not delusional. Cognition and memory are not impaired. The patient does not express impulsivity or inappropriate judgment and does not exhibit a depressed mood. The patient expresses no homicidal and no suicidal ideation and has no suicidal plans and no homicidal plans. The patient is communicative and exhibits a normal recent memory and normal remote memory. The patient is attentive.     Encounter Diagnoses   Name Primary?    Attention deficit hyperactivity disorder (ADHD), predominantly inattentive type     Autism spectrum disorder Yes    Anxiety state        PLAN:    I am having Trey Slaughter start on dextroamphetamine-amphetamine and dextroamphetamine-amphetamine. I am also having him maintain his propranoloL and dextroamphetamine-amphetamine.    Trey was seen today for follow-up.    Diagnoses and all orders for this visit:    Autism spectrum disorder  -     Ambulatory referral/consult to Psychiatry; Future    Attention deficit hyperactivity disorder (ADHD), predominantly inattentive type  -     dextroamphetamine-amphetamine 10 mg Tab; Take 1 tablet (10 mg total) by mouth 2 (two) times a day.  -     Ambulatory referral/consult to Psychiatry; Future    Anxiety state    Other orders  -     dextroamphetamine-amphetamine (ADDERALL) 10 mg Tab; Take 1 tablet (10 mg total) by mouth 2 (two) times a day.  -     " dextroamphetamine-amphetamine (ADDERALL) 10 mg Tab; Take 1 tablet (10 mg total) by mouth 2 (two) times a day.        Medications Ordered This Encounter   Medications    dextroamphetamine-amphetamine (ADDERALL) 10 mg Tab     Sig: Take 1 tablet (10 mg total) by mouth 2 (two) times a day.     Dispense:  60 tablet     Refill:  0    dextroamphetamine-amphetamine (ADDERALL) 10 mg Tab     Sig: Take 1 tablet (10 mg total) by mouth 2 (two) times a day.     Dispense:  60 tablet     Refill:  0    dextroamphetamine-amphetamine 10 mg Tab     Sig: Take 1 tablet (10 mg total) by mouth 2 (two) times a day.     Dispense:  60 tablet     Refill:  0     Medications Ordered This Encounter   Medications    dextroamphetamine-amphetamine (ADDERALL) 10 mg Tab     Sig: Take 1 tablet (10 mg total) by mouth 2 (two) times a day.     Dispense:  60 tablet     Refill:  0    dextroamphetamine-amphetamine (ADDERALL) 10 mg Tab     Sig: Take 1 tablet (10 mg total) by mouth 2 (two) times a day.     Dispense:  60 tablet     Refill:  0    dextroamphetamine-amphetamine 10 mg Tab     Sig: Take 1 tablet (10 mg total) by mouth 2 (two) times a day.     Dispense:  60 tablet     Refill:  0     Orders Placed This Encounter   Procedures    Ambulatory referral/consult to Psychiatry     Standing Status:   Future     Standing Expiration Date:   3/27/2024     Referral Priority:   Routine     Referral Type:   Psychiatric     Referral Reason:   Specialty Services Required     Referred to Provider:   Humberto Gallagher MD     Requested Specialty:   Psychiatry     Number of Visits Requested:   1

## 2023-02-27 NOTE — ASSESSMENT & PLAN NOTE
-Continue propranolol 80 mg p.o. daily   -patient does have lot of anxiety about possibly having autism and would like to get formally evaluated  -sending a referral to Psychiatry since just meeting with [employer] counselors is  not seeming to help him much

## 2023-03-06 DIAGNOSIS — F90.0 ATTENTION DEFICIT HYPERACTIVITY DISORDER (ADHD), PREDOMINANTLY INATTENTIVE TYPE: Primary | ICD-10-CM

## 2023-03-06 RX ORDER — DEXTROAMPHETAMINE SACCHARATE, AMPHETAMINE ASPARTATE, DEXTROAMPHETAMINE SULFATE AND AMPHETAMINE SULFATE 2.5; 2.5; 2.5; 2.5 MG/1; MG/1; MG/1; MG/1
1 TABLET ORAL 2 TIMES DAILY
Qty: 60 TABLET | Refills: 0 | Status: SHIPPED | OUTPATIENT
Start: 2023-04-02 | End: 2023-04-03 | Stop reason: SDUPTHER

## 2023-03-06 RX ORDER — DEXTROAMPHETAMINE SACCHARATE, AMPHETAMINE ASPARTATE, DEXTROAMPHETAMINE SULFATE AND AMPHETAMINE SULFATE 5; 5; 5; 5 MG/1; MG/1; MG/1; MG/1
TABLET ORAL
Qty: 30 TABLET | Refills: 0 | Status: SHIPPED | OUTPATIENT
Start: 2023-03-06 | End: 2023-05-31

## 2023-03-06 NOTE — TELEPHONE ENCOUNTER
----- Message from Cherie Garcia sent at 3/6/2023  9:23 AM CST -----  Regarding: Medicatyion Refill  MEDICATION REFILL REQUEST      PATIENT PHONE #: [phone]     :     PHARMACY: [facility] [address]      PHARMACY PHONE #:      ALLERGIES:     MESSAGE Pt called and stated he needs a refill on his Adderall sent to the pharmacy.. please advise

## 2023-03-06 NOTE — TELEPHONE ENCOUNTER
[facility] called stating [facility] stated they do not carry the 10 mg Adderall tablets. [facility] stated to Mr. Yang to have us send in a prescription for 20 mg a half a tab BID.

## 2023-04-03 DIAGNOSIS — F90.0 ATTENTION DEFICIT HYPERACTIVITY DISORDER (ADHD), PREDOMINANTLY INATTENTIVE TYPE: ICD-10-CM

## 2023-04-03 RX ORDER — DEXTROAMPHETAMINE SACCHARATE, AMPHETAMINE ASPARTATE, DEXTROAMPHETAMINE SULFATE AND AMPHETAMINE SULFATE 2.5; 2.5; 2.5; 2.5 MG/1; MG/1; MG/1; MG/1
1 TABLET ORAL 2 TIMES DAILY
Qty: 60 TABLET | Refills: 0 | Status: SHIPPED | OUTPATIENT
Start: 2023-04-03 | End: 2023-05-03

## 2023-04-03 NOTE — TELEPHONE ENCOUNTER
----- Message from Niall Antonio sent at 4/3/2023  8:44 AM CDT -----  Regarding: refill  .Type:  RX Refill Request    Who Called: yana  Refill or New Rx:refill  RX Name and Strength:dextroamphetamine-amphetamine   How is the patient currently taking it? (ex. 1XDay):1xday  Is this a 30 day or 90 day RX:30 day  [facility]  Local or Mail Order:local  Ordering Provider  Would the patient rather a call back or a response via [facility]?   Best Call Back Number:[phone]   Additional Information:

## 2023-05-23 DIAGNOSIS — F90.0 ATTENTION DEFICIT HYPERACTIVITY DISORDER (ADHD), PREDOMINANTLY INATTENTIVE TYPE: ICD-10-CM

## 2023-05-23 NOTE — TELEPHONE ENCOUNTER
----- Message from Nancy Mckenna MA sent at 5/18/2023  4:48 PM CDT -----  Regarding: Dr JOSÉ MONTEZ Wednesday 5-31-23       1. Are there any outstanding Labs, imaging or referrals in the patient's chart?      3 month follow up for ADHD    No labs required    Last wellness 8-16-22           2. . Has the patient been seen in an ER, urgent care clinic, or any other health care    provider since their last visit? If yes when and where?

## 2023-05-23 NOTE — TELEPHONE ENCOUNTER
----- Message from Kim Porter sent at 5/23/2023  2:48 PM CDT -----  Regarding: refill  Wants adderall 10 mg refilled at [facility] on [address]. His callback is [phone]

## 2023-05-24 RX ORDER — DEXTROAMPHETAMINE SACCHARATE, AMPHETAMINE ASPARTATE, DEXTROAMPHETAMINE SULFATE AND AMPHETAMINE SULFATE 2.5; 2.5; 2.5; 2.5 MG/1; MG/1; MG/1; MG/1
10 TABLET ORAL 2 TIMES DAILY
Qty: 60 TABLET | Refills: 0 | Status: SHIPPED | OUTPATIENT
Start: 2023-05-24 | End: 2023-07-06 | Stop reason: SDUPTHER

## 2023-05-31 ENCOUNTER — OFFICE VISIT (OUTPATIENT)
Dept: INTERNAL MEDICINE | Facility: CLINIC | Age: 23
End: 2023-05-31
Payer: COMMERCIAL

## 2023-05-31 VITALS
HEIGHT: 71 IN | WEIGHT: 175 LBS | SYSTOLIC BLOOD PRESSURE: 124 MMHG | BODY MASS INDEX: 24.5 KG/M2 | HEART RATE: 68 BPM | DIASTOLIC BLOOD PRESSURE: 68 MMHG | OXYGEN SATURATION: 99 %

## 2023-05-31 DIAGNOSIS — F90.0 ATTENTION DEFICIT HYPERACTIVITY DISORDER (ADHD), PREDOMINANTLY INATTENTIVE TYPE: Primary | Chronic | ICD-10-CM

## 2023-05-31 PROCEDURE — 3008F PR BODY MASS INDEX (BMI) DOCUMENTED: ICD-10-PCS | Mod: CPTII,,, | Performed by: INTERNAL MEDICINE

## 2023-05-31 PROCEDURE — 1159F PR MEDICATION LIST DOCUMENTED IN MEDICAL RECORD: ICD-10-PCS | Mod: CPTII,,, | Performed by: INTERNAL MEDICINE

## 2023-05-31 PROCEDURE — 3078F DIAST BP <80 MM HG: CPT | Mod: CPTII,,, | Performed by: INTERNAL MEDICINE

## 2023-05-31 PROCEDURE — 3074F PR MOST RECENT SYSTOLIC BLOOD PRESSURE < 130 MM HG: ICD-10-PCS | Mod: CPTII,,, | Performed by: INTERNAL MEDICINE

## 2023-05-31 PROCEDURE — 1159F MED LIST DOCD IN RCRD: CPT | Mod: CPTII,,, | Performed by: INTERNAL MEDICINE

## 2023-05-31 PROCEDURE — 99214 OFFICE O/P EST MOD 30 MIN: CPT | Mod: ,,, | Performed by: INTERNAL MEDICINE

## 2023-05-31 PROCEDURE — 3008F BODY MASS INDEX DOCD: CPT | Mod: CPTII,,, | Performed by: INTERNAL MEDICINE

## 2023-05-31 PROCEDURE — 1160F PR REVIEW ALL MEDS BY PRESCRIBER/CLIN PHARMACIST DOCUMENTED: ICD-10-PCS | Mod: CPTII,,, | Performed by: INTERNAL MEDICINE

## 2023-05-31 PROCEDURE — 99214 PR OFFICE/OUTPT VISIT, EST, LEVL IV, 30-39 MIN: ICD-10-PCS | Mod: ,,, | Performed by: INTERNAL MEDICINE

## 2023-05-31 PROCEDURE — 3074F SYST BP LT 130 MM HG: CPT | Mod: CPTII,,, | Performed by: INTERNAL MEDICINE

## 2023-05-31 PROCEDURE — 1160F RVW MEDS BY RX/DR IN RCRD: CPT | Mod: CPTII,,, | Performed by: INTERNAL MEDICINE

## 2023-05-31 PROCEDURE — 3078F PR MOST RECENT DIASTOLIC BLOOD PRESSURE < 80 MM HG: ICD-10-PCS | Mod: CPTII,,, | Performed by: INTERNAL MEDICINE

## 2023-05-31 NOTE — ASSESSMENT & PLAN NOTE
-currently on Adderall 10 mg p.o. b.i.d. immediate release, does not do very well with the extended-release since he is unable to fall asleep at night  -no acute issues   -follow-up in 3 months

## 2023-05-31 NOTE — PROGRESS NOTES
"CC:Attention Deficit Disorder  HPI:    22-year-old male here today for a three-month follow-up visit.  Medical comorbidities include anxiety, ADHD.   seems to be doing well with the propranolol with the anxiety.  At our last visit a referral was sent to Psychiatry to get evaluated for adult onset autism.       Wellness: 8/16/22    Problem List Items Addressed This Visit          Psychiatric    Attention deficit hyperactivity disorder (ADHD) - Primary (Chronic)    Current Assessment & Plan     -currently on Adderall 10 mg p.o. b.i.d. immediate release, does not do very well with the extended-release since he is unable to fall asleep at night  -no acute issues   -follow-up in 3 months            The patient's Health Maintenance was reviewed and the following appears to be due at this time:  Health Maintenance Due   Topic Date Due    Hepatitis C Screening  Never done       [unfilled]  Outpatient Medications Prior to Visit   Medication Sig Dispense Refill    dextroamphetamine-amphetamine 10 mg Tab Take 1 tablet (10 mg total) by mouth 2 (two) times a day. 60 tablet 0    propranoloL (INDERAL LA) 80 MG 24 hr capsule TAKE 1 CAPSULE BY MOUTH EVERY DAY 90 capsule 1    dextroamphetamine-amphetamine (ADDERALL) 20 mg tablet 0.5 tab PO BID 30 tablet 0     No facility-administered medications prior to visit.        Physical Exam   /68   Pulse 68   Ht 5' 11" (1.803 m)   Wt 79.4 kg (175 lb)   SpO2 99%   BMI 24.41 kg/m²   Constitutional: The patient appears well-developed and well-nourished. No distress.   Skin: Not diaphoretic.   Psychiatric: The mood appears not anxious and the affect is not angry, not blunt, not labile and not inappropriate. Speech is not rapid and/or pressured, not delayed, not tangential and not slurred. The patient is not agitated, not aggressive, is not hyperactive, not slowed, not withdrawn, not actively hallucinating and not combative. Thought content is not paranoid and not delusional. " Cognition and memory are not impaired. The patient does not express impulsivity or inappropriate judgment and does not exhibit a depressed mood. The patient expresses no homicidal and no suicidal ideation and has no suicidal plans and no homicidal plans. The patient is communicative and exhibits a normal recent memory and normal remote memory. The patient is attentive.     Encounter Diagnosis   Name Primary?    Attention deficit hyperactivity disorder (ADHD), predominantly inattentive type Yes       PLAN:    I am having Trey Slaughter maintain his propranoloL and dextroamphetamine-amphetamine.    Trey was seen today for follow-up.    Diagnoses and all orders for this visit:    Attention deficit hyperactivity disorder (ADHD), predominantly inattentive type              No orders of the defined types were placed in this encounter.

## 2023-07-06 DIAGNOSIS — F90.0 ATTENTION DEFICIT HYPERACTIVITY DISORDER (ADHD), PREDOMINANTLY INATTENTIVE TYPE: ICD-10-CM

## 2023-07-06 RX ORDER — DEXTROAMPHETAMINE SACCHARATE, AMPHETAMINE ASPARTATE, DEXTROAMPHETAMINE SULFATE AND AMPHETAMINE SULFATE 2.5; 2.5; 2.5; 2.5 MG/1; MG/1; MG/1; MG/1
10 TABLET ORAL 2 TIMES DAILY
Qty: 60 TABLET | Refills: 0 | Status: SHIPPED | OUTPATIENT
Start: 2023-07-06 | End: 2023-08-09 | Stop reason: SDUPTHER

## 2023-07-06 NOTE — TELEPHONE ENCOUNTER
----- Message from Rik Bustillo sent at 7/6/2023  8:23 AM CDT -----  .Type:  RX Refill Request    Who Called: pt   Refill or New Rx:refill   RX Name and Strength:dextroamphetamine-amphetamine 10 mg Tab  How is the patient currently taking it? (ex. 1XDay):Take 1 tablet (10 mg total) by mouth 2 (two) times a day. - Oral  Is this a 30 day or 90 day RX:30  Preferred Pharmacy with phone number[id number] [place] [facility] [address]  Local or Mail Order: local   Ordering Provider:Viktoria  Would the patient rather a call back or a response via [facility]? Call back   Best Call Back Number:[phone]  Additional Information:

## 2023-08-09 DIAGNOSIS — F90.0 ATTENTION DEFICIT HYPERACTIVITY DISORDER (ADHD), PREDOMINANTLY INATTENTIVE TYPE: ICD-10-CM

## 2023-08-09 RX ORDER — DEXTROAMPHETAMINE SACCHARATE, AMPHETAMINE ASPARTATE, DEXTROAMPHETAMINE SULFATE AND AMPHETAMINE SULFATE 2.5; 2.5; 2.5; 2.5 MG/1; MG/1; MG/1; MG/1
10 TABLET ORAL 2 TIMES DAILY
Qty: 60 TABLET | Refills: 0 | Status: SHIPPED | OUTPATIENT
Start: 2023-08-09 | End: 2023-09-08 | Stop reason: SDUPTHER

## 2023-08-09 NOTE — TELEPHONE ENCOUNTER
----- Message from Halley Peralta sent at 8/9/2023  9:16 AM CDT -----  Regarding: refill  Type:  RX Refill Request    Who Called: pt  Refill or New Rx:refill  RX Name and Strength:dextroamphetamine-amphetamine 10 mg Tab  How is the patient currently taking it? (ex. 1XDay):2x day  Is this a 30 day or 90 day RX:30  Preferred Pharmacy with phone number: [facility]  Local or Mail Order:loc  Ordering Provider:dr lu  Would the patient rather a call back or a response via [facility]? C/b  Best Call Back Number:[phone]  Additional Information:

## 2023-08-24 ENCOUNTER — TELEPHONE (OUTPATIENT)
Dept: INTERNAL MEDICINE | Facility: CLINIC | Age: 23
End: 2023-08-24
Payer: COMMERCIAL

## 2023-08-31 ENCOUNTER — OFFICE VISIT (OUTPATIENT)
Dept: INTERNAL MEDICINE | Facility: CLINIC | Age: 23
End: 2023-08-31
Payer: COMMERCIAL

## 2023-08-31 VITALS
DIASTOLIC BLOOD PRESSURE: 68 MMHG | BODY MASS INDEX: 24.5 KG/M2 | HEIGHT: 71 IN | WEIGHT: 175 LBS | SYSTOLIC BLOOD PRESSURE: 124 MMHG

## 2023-08-31 DIAGNOSIS — F90.0 ATTENTION DEFICIT HYPERACTIVITY DISORDER (ADHD), PREDOMINANTLY INATTENTIVE TYPE: Primary | Chronic | ICD-10-CM

## 2023-08-31 DIAGNOSIS — F41.1 ANXIETY STATE: ICD-10-CM

## 2023-08-31 PROCEDURE — 99214 OFFICE O/P EST MOD 30 MIN: CPT | Mod: 95,,, | Performed by: INTERNAL MEDICINE

## 2023-08-31 PROCEDURE — 3074F SYST BP LT 130 MM HG: CPT | Mod: CPTII,95,, | Performed by: INTERNAL MEDICINE

## 2023-08-31 PROCEDURE — 99214 PR OFFICE/OUTPT VISIT, EST, LEVL IV, 30-39 MIN: ICD-10-PCS | Mod: 95,,, | Performed by: INTERNAL MEDICINE

## 2023-08-31 PROCEDURE — 3008F PR BODY MASS INDEX (BMI) DOCUMENTED: ICD-10-PCS | Mod: CPTII,95,, | Performed by: INTERNAL MEDICINE

## 2023-08-31 PROCEDURE — 3008F BODY MASS INDEX DOCD: CPT | Mod: CPTII,95,, | Performed by: INTERNAL MEDICINE

## 2023-08-31 PROCEDURE — 1160F PR REVIEW ALL MEDS BY PRESCRIBER/CLIN PHARMACIST DOCUMENTED: ICD-10-PCS | Mod: CPTII,95,, | Performed by: INTERNAL MEDICINE

## 2023-08-31 PROCEDURE — 3074F PR MOST RECENT SYSTOLIC BLOOD PRESSURE < 130 MM HG: ICD-10-PCS | Mod: CPTII,95,, | Performed by: INTERNAL MEDICINE

## 2023-08-31 PROCEDURE — 3078F DIAST BP <80 MM HG: CPT | Mod: CPTII,95,, | Performed by: INTERNAL MEDICINE

## 2023-08-31 PROCEDURE — 3078F PR MOST RECENT DIASTOLIC BLOOD PRESSURE < 80 MM HG: ICD-10-PCS | Mod: CPTII,95,, | Performed by: INTERNAL MEDICINE

## 2023-08-31 PROCEDURE — 1159F PR MEDICATION LIST DOCUMENTED IN MEDICAL RECORD: ICD-10-PCS | Mod: CPTII,95,, | Performed by: INTERNAL MEDICINE

## 2023-08-31 PROCEDURE — 1160F RVW MEDS BY RX/DR IN RCRD: CPT | Mod: CPTII,95,, | Performed by: INTERNAL MEDICINE

## 2023-08-31 PROCEDURE — 1159F MED LIST DOCD IN RCRD: CPT | Mod: CPTII,95,, | Performed by: INTERNAL MEDICINE

## 2023-08-31 NOTE — PROGRESS NOTES
Visit type: Virtual visit with synchronous audio and video for which I was unable to connect on the video because of system wide issues     Total time spent with patient: 20 minutes  25 minutes of total time spent on the encounter, which includes face to face time and non-face to face time preparing to see the patient (eg, review of tests), obtaining and/or reviewing separately obtained history, documenting clinical information in the electronic or other health record, independently interpreting results (not separately reported) and communicating results to the patient/family/caregiver, or care coordination (not separately reported).    Each patient to whom he or she provides medical services by telemedicine is:  (1) informed of the relationship between the physician and patient and the respective role of any other health care provider with respect to management of the patient; and (2) notified that he or she may decline to receive medical services by telemedicine and may withdraw from such care at any time.     CC:Attention Deficit Disorder  HPI: 22-year-old male seen via telemedicine for his ADHD refill visit..  Medical comorbidities include anxiety, ADHD.   seems to be doing well with the propranolol with the anxiety.  Overall doing well and has no acute needs or complaints     Wellness: 8/16/22    Problem List Items Addressed This Visit          Psychiatric    Attention deficit hyperactivity disorder (ADHD) - Primary (Chronic)    Current Assessment & Plan     -doing well, now on Adderall 10 mg p.o. b.i.d. immediate release and this seems to be working much better for the patient rather than the extended-release tablets   -no acute issues with anxiety, palpitations, shortness of breath or chest pain   -no excessive loss of appetite         Anxiety state    Current Assessment & Plan     -on propranolol, doing well, continue            The patient's Health Maintenance was reviewed and the following appears to be  "due at this time:  Health Maintenance Due   Topic Date Due    Hepatitis C Screening  Never done    HPV Vaccines (2 - Male 3-dose series) 06/12/2019    COVID-19 Vaccine (3 - Pfizer series) 12/10/2021    Influenza Vaccine (1) Never done       [unfilled]  Outpatient Medications Prior to Visit   Medication Sig Dispense Refill    dextroamphetamine-amphetamine 10 mg Tab Take 1 tablet (10 mg total) by mouth 2 (two) times a day. 60 tablet 0    propranoloL (INDERAL LA) 80 MG 24 hr capsule TAKE 1 CAPSULE BY MOUTH EVERY DAY 90 capsule 1     No facility-administered medications prior to visit.        Physical Exam   /68   Ht 5' 11" (1.803 m)   Wt 79.4 kg (175 lb)   BMI 24.41 kg/m²   Constitutional: The patient appears well-developed and well-nourished. No distress.      Psychiatric: The mood appears not anxious and the affect is not angry, not blunt, not labile and not inappropriate. Speech is not rapid and/or pressured, not delayed, not tangential and not slurred. The patient is not agitated, not aggressive, is not hyperactive, not slowed, not withdrawn, not actively hallucinating and not combative. Thought content is not paranoid and not delusional. Cognition and memory are not impaired. The patient does not express impulsivity or inappropriate judgment and does not exhibit a depressed mood. The patient expresses no homicidal and no suicidal ideation and has no suicidal plans and no homicidal plans. The patient is communicative and exhibits a normal recent memory and normal remote memory. The patient is attentive.     Encounter Diagnoses   Name Primary?    Attention deficit hyperactivity disorder (ADHD), predominantly inattentive type Yes    Anxiety state        PLAN:    I am having Trey Slaughter maintain his propranoloL and dextroamphetamine-amphetamine.    Trey was seen today for follow-up.    Diagnoses and all orders for this visit:    Attention deficit hyperactivity disorder (ADHD), predominantly " inattentive type    Anxiety state              No orders of the defined types were placed in this encounter.

## 2023-09-01 ENCOUNTER — TELEPHONE (OUTPATIENT)
Dept: INTERNAL MEDICINE | Facility: CLINIC | Age: 23
End: 2023-09-01
Payer: COMMERCIAL

## 2023-09-01 NOTE — ASSESSMENT & PLAN NOTE
-doing well, now on Adderall 10 mg p.o. b.i.d. immediate release and this seems to be working much better for the patient rather than the extended-release tablets   -no acute issues with anxiety, palpitations, shortness of breath or chest pain   -no excessive loss of appetite

## 2023-09-08 DIAGNOSIS — F90.0 ATTENTION DEFICIT HYPERACTIVITY DISORDER (ADHD), PREDOMINANTLY INATTENTIVE TYPE: Primary | ICD-10-CM

## 2023-09-08 RX ORDER — DEXTROAMPHETAMINE SACCHARATE, AMPHETAMINE ASPARTATE, DEXTROAMPHETAMINE SULFATE AND AMPHETAMINE SULFATE 2.5; 2.5; 2.5; 2.5 MG/1; MG/1; MG/1; MG/1
10 TABLET ORAL 2 TIMES DAILY
Qty: 60 TABLET | Refills: 0 | Status: SHIPPED | OUTPATIENT
Start: 2023-09-08 | End: 2023-10-11 | Stop reason: SDUPTHER

## 2023-09-08 NOTE — TELEPHONE ENCOUNTER
----- Message from Debra Sena sent at 9/8/2023  7:48 AM CDT -----  Regarding: Refill Request  .Type:  RX Refill Request    Who Called: pt  Refill or New Rx:refill   RX Name and Strength:dextroamphetamine-amphetamine 10 mg Tab  How is the patient currently taking it? (ex. 1XDay):1x  Is this a 30 day or 90 day RX:30  Preferred Pharmacy with phone number:[id number] [place] [facility] [address]  Local or Mail Order:local   Ordering Provider:LINN Blum  Would the patient rather a call back or a response via [facility]?   Best Call Back Number:[phone]  Additional Information: refill request

## 2023-09-26 ENCOUNTER — HOSPITAL ENCOUNTER (EMERGENCY)
Facility: HOSPITAL | Age: 23
Discharge: HOME | End: 2023-09-26
Attending: EMERGENCY MEDICINE
Payer: COMMERCIAL

## 2023-09-26 VITALS
WEIGHT: 175 LBS | DIASTOLIC BLOOD PRESSURE: 73 MMHG | BODY MASS INDEX: 24.5 KG/M2 | TEMPERATURE: 99 F | HEIGHT: 71 IN | HEART RATE: 73 BPM | SYSTOLIC BLOOD PRESSURE: 115 MMHG | RESPIRATION RATE: 17 BRPM | OXYGEN SATURATION: 96 %

## 2023-09-26 DIAGNOSIS — R51.9 NONINTRACTABLE EPISODIC HEADACHE, UNSPECIFIED HEADACHE TYPE: Primary | ICD-10-CM

## 2023-09-26 LAB
ALBUMIN SERPL-MCNC: 5.1 G/DL (ref 3.5–5)
ALBUMIN/GLOB SERPL: 1.9 RATIO (ref 1.1–2)
ALP SERPL-CCNC: 63 UNIT/L (ref 40–150)
ALT SERPL-CCNC: 26 UNIT/L (ref 0–55)
APPEARANCE UR: CLEAR
AST SERPL-CCNC: 27 UNIT/L (ref 5–34)
BACTERIA #/AREA URNS AUTO: NORMAL /HPF
BASOPHILS # BLD AUTO: 0.05 X10(3)/MCL
BASOPHILS NFR BLD AUTO: 0.6 %
BILIRUB SERPL-MCNC: 2.5 MG/DL
BILIRUB UR QL STRIP.AUTO: NEGATIVE
BUN SERPL-MCNC: 11.9 MG/DL (ref 8.9–20.6)
CALCIUM SERPL-MCNC: 10.3 MG/DL (ref 8.4–10.2)
CHLORIDE SERPL-SCNC: 105 MMOL/L (ref 98–107)
CO2 SERPL-SCNC: 25 MMOL/L (ref 22–29)
COLOR UR AUTO: YELLOW
CREAT SERPL-MCNC: 1.05 MG/DL (ref 0.73–1.18)
EOSINOPHIL # BLD AUTO: 0.17 X10(3)/MCL (ref 0–0.9)
EOSINOPHIL NFR BLD AUTO: 1.9 %
ERYTHROCYTE [DISTWIDTH] IN BLOOD BY AUTOMATED COUNT: 11.7 % (ref 11.5–17)
GFR SERPLBLD CREATININE-BSD FMLA CKD-EPI: >60 MLS/MIN/1.73/M2
GLOBULIN SER-MCNC: 2.7 GM/DL (ref 2.4–3.5)
GLUCOSE SERPL-MCNC: 93 MG/DL (ref 74–100)
GLUCOSE UR QL STRIP.AUTO: NEGATIVE
HCT VFR BLD AUTO: 47.1 % (ref 42–52)
HGB BLD-MCNC: 17.2 G/DL (ref 14–18)
IMM GRANULOCYTES # BLD AUTO: 0.02 X10(3)/MCL (ref 0–0.04)
IMM GRANULOCYTES NFR BLD AUTO: 0.2 %
KETONES UR QL STRIP.AUTO: NEGATIVE
LEUKOCYTE ESTERASE UR QL STRIP.AUTO: NEGATIVE
LYMPHOCYTES # BLD AUTO: 2.59 X10(3)/MCL (ref 0.6–4.6)
LYMPHOCYTES NFR BLD AUTO: 29.2 %
MCH RBC QN AUTO: 31 PG (ref 27–31)
MCHC RBC AUTO-ENTMCNC: 36.5 G/DL (ref 33–36)
MCV RBC AUTO: 85 FL (ref 80–94)
MONOCYTES # BLD AUTO: 0.66 X10(3)/MCL (ref 0.1–1.3)
MONOCYTES NFR BLD AUTO: 7.4 %
NEUTROPHILS # BLD AUTO: 5.37 X10(3)/MCL (ref 2.1–9.2)
NEUTROPHILS NFR BLD AUTO: 60.7 %
NITRITE UR QL STRIP.AUTO: NEGATIVE
NRBC BLD AUTO-RTO: 0 %
PH UR STRIP.AUTO: 7 [PH]
PLATELET # BLD AUTO: 259 X10(3)/MCL (ref 130–400)
PMV BLD AUTO: 9.6 FL (ref 7.4–10.4)
POTASSIUM SERPL-SCNC: 3.9 MMOL/L (ref 3.5–5.1)
PROT SERPL-MCNC: 7.8 GM/DL (ref 6.4–8.3)
PROT UR QL STRIP.AUTO: NEGATIVE
RBC # BLD AUTO: 5.54 X10(6)/MCL (ref 4.7–6.1)
RBC #/AREA URNS AUTO: NORMAL /HPF
RBC UR QL AUTO: NEGATIVE
SODIUM SERPL-SCNC: 140 MMOL/L (ref 136–145)
SP GR UR STRIP.AUTO: 1.01 (ref 1–1.03)
SQUAMOUS #/AREA URNS AUTO: NORMAL /HPF
UROBILINOGEN UR STRIP-ACNC: 0.2
WBC # SPEC AUTO: 8.86 X10(3)/MCL (ref 4.5–11.5)
WBC #/AREA URNS AUTO: NORMAL /HPF

## 2023-09-26 PROCEDURE — 80053 COMPREHEN METABOLIC PANEL: CPT

## 2023-09-26 PROCEDURE — 81001 URINALYSIS AUTO W/SCOPE: CPT

## 2023-09-26 PROCEDURE — 99284 EMERGENCY DEPT VISIT MOD MDM: CPT | Mod: 25

## 2023-09-26 PROCEDURE — 85025 COMPLETE CBC W/AUTO DIFF WBC: CPT

## 2023-09-26 RX ORDER — BUTALBITAL, ACETAMINOPHEN AND CAFFEINE 50; 325; 40 MG/1; MG/1; MG/1
1 TABLET ORAL EVERY 4 HOURS PRN
Qty: 60 TABLET | Refills: 0 | Status: SHIPPED | OUTPATIENT
Start: 2023-09-26 | End: 2023-12-11

## 2023-09-26 RX ORDER — BUTALBITAL, ACETAMINOPHEN AND CAFFEINE 50; 325; 40 MG/1; MG/1; MG/1
1 TABLET ORAL
Status: DISCONTINUED | OUTPATIENT
Start: 2023-09-26 | End: 2023-09-26 | Stop reason: HOSPADM

## 2023-09-26 NOTE — FIRST PROVIDER EVALUATION
"Medical screening examination initiated.  I have conducted a focused provider triage encounter, findings are as follows:    Brief history of present illness:  23 year old male presents to ER with headache onset this morning. Reports tingling sensation to right side of head. He does report light sensitivity. Denies changes in vision, n/v    Vitals:    09/26/23 1446   BP: (!) 148/91   Pulse: 73   Resp: 17   Temp: 98.9 °F (37.2 °C)   TempSrc: Oral   SpO2: 96%   Weight: 79.4 kg (175 lb)   Height: 5' 11" (1.803 m)       Pertinent physical exam:  awake and alert, nad    Brief workup plan:  labs, imaging     Preliminary workup initiated; this workup will be continued and followed by the physician or advanced practice provider that is assigned to the patient when roomed.  "

## 2023-09-26 NOTE — ED PROVIDER NOTES
Encounter Date: 9/26/2023       History     Chief Complaint   Patient presents with    Headache     Pt reports HA that is cold/tingling sensation to R side of head since this morning w light sensitivity. Denies vision changes/NV. Ambulatory to triage     See MDM for details         Review of patient's allergies indicates:  No Known Allergies  Past Medical History:   Diagnosis Date    Anxiety state     Attention-deficit hyperactivity disorder, unspecified type     Hyperbilirubinemia     Low back pain     Pityriasis versicolor      Past Surgical History:   Procedure Laterality Date    JOINT REPLACEMENT  December of 2018    AC Joint    Shoulder joint operation N/A     TONSILLECTOMY      I dont remember when     Family History   Problem Relation Age of Onset    Hypertension Mother      Social History     Tobacco Use    Smoking status: Never    Smokeless tobacco: Never   Substance Use Topics    Alcohol use: Never    Drug use: Not Currently     Review of Systems   Constitutional:  Negative for chills and fever.   Gastrointestinal:  Negative for nausea and vomiting.   Neurological:  Positive for headaches. Negative for dizziness, weakness and numbness.   All other systems reviewed and are negative.      Physical Exam     Initial Vitals [09/26/23 1446]   BP Pulse Resp Temp SpO2   (!) 148/91 73 17 98.9 °F (37.2 °C) 96 %      MAP       --         Physical Exam    Nursing note and vitals reviewed.  Constitutional: He appears well-developed and well-nourished. No distress.   HENT:   Head: Normocephalic and atraumatic.   Eyes: EOM are normal. Pupils are equal, round, and reactive to light.   Cardiovascular:  Normal rate, regular rhythm and normal heart sounds.           Pulmonary/Chest: Breath sounds normal. No respiratory distress.   Musculoskeletal:         General: Normal range of motion.     Neurological: He is alert and oriented to person, place, and time. GCS score is 15. GCS eye subscore is 4. GCS verbal subscore is 5.  GCS motor subscore is 6.   Skin: Skin is warm and dry. Capillary refill takes less than 2 seconds.   Psychiatric: He has a normal mood and affect. Thought content normal.         ED Course   Procedures  Labs Reviewed   COMPREHENSIVE METABOLIC PANEL - Abnormal; Notable for the following components:       Result Value    Calcium Level Total 10.3 (*)     Albumin Level 5.1 (*)     Bilirubin Total 2.5 (*)     All other components within normal limits   CBC WITH DIFFERENTIAL - Abnormal; Notable for the following components:    MCHC 36.5 (*)     All other components within normal limits   URINALYSIS, REFLEX TO URINE CULTURE - Normal   CBC W/ AUTO DIFFERENTIAL    Narrative:     The following orders were created for panel order CBC Auto Differential.  Procedure                               Abnormality         Status                     ---------                               -----------         ------                     CBC with Differential[[id number]]       Abnormal            Final result                 Please view results for these tests on the individual orders.   URINALYSIS, MICROSCOPIC          Imaging Results              CT Head Without Contrast (Final result)  Result time 09/26/23 15:33:22      Final result by Emile Garcia MD (09/26/23 15:33:22)                   Narrative:    EXAMINATION  CT HEAD WITHOUT CONTRAST    CLINICAL HISTORY  Headache, new or worsening, neuro deficit (Age 19-49y);    TECHNIQUE  Axial non-contrast CT images of the head were acquired and multiplanar reconstructions accomplished by a CT technologist at a separate workstation, pushed to PACS for physician review.    COMPARISON  None available at the time of the initial interpretation.    FINDINGS  Images were reviewed in subdural, brain, soft tissue, and bone windows.    Exam quality: Motion/streak artifact limits assessment of the posterior fossa.    Hemorrhage: No evidence of acute hyperattenuating blood products.    Parenchyma: No  discrete mass, localized mass-effect, or CT evidence of an acute territorial cortical-based ischemic insult. Gray-white differentiation is preserved.    Midline shift: None.    CSF spaces: Normal ventricle size and configuration. No extra-axial masses or expansile fluid collections.    Vasculature: No hyperdense artery identified. No abnormal densities within the dural sinuses.    Other findings: No abnormalities of the scalp or subjacent osseous structures. Mastoids are well aerated. No focal abnormality of the sella. The included facial structures are unremarkable.    IMPRESSION  No CT-evident acute intracranial abnormality.    RADIATION DOSE  Automated tube current modulation, weight-based exposure dosing, and/or iterative reconstruction technique utilized to reach lowest reasonably achievable exposure rate.    DLP: 987 mGy*cm      Electronically signed by: Emile Garcia  Date:    09/26/2023  Time:    15:33                                     Medications   lactated ringers bolus 1,000 mL (1,000 mLs Intravenous Not Given 9/26/23 1500)   butalbital-acetaminophen-caffeine -40 mg per tablet 1 tablet (1 tablet Oral Not Given 9/26/23 1500)     Medical Decision Making  23 year old presents to the ED for evaluation of new onset right head pain x 1 hours PTA. The patient reports that the pain was located to the right temple and behind right eye. He reports worsening pain is bright lights. He denies any visual disturbance, confusion, or weakness. He also reports onset may be related to episodes of HTN, reports 140/96 around onset. He denies any history of current treatment for HTN. The patient reports a history of migraine headaches in the past, but not in this location. The patient reports that episode has self-resolved since arrival to ED.    Fioricet given in ED with relief. CT Head performed without acute abnormality. Will d/c home with Fioricet at home for headaches. Recommend continued outpatient follow up with  PCP for management of suspected migraine headaches and episodes of HTN. Patient verbalized understanding of plan. Return to ER precautions given    Amount and/or Complexity of Data Reviewed  Labs: ordered. Decision-making details documented in ED Course.     Details: CBC/ CMP negative  Radiology: ordered. Decision-making details documented in ED Course.     Details: CT Head negative    Risk  Prescription drug management.      Additional MDM:   Differential Diagnosis:   Other: The following diagnoses were also considered and will be evaluated: migraine headache, trigeminal neuralgia and tension headache.                            Clinical Impression:   Final diagnoses:  [R51.9] Nonintractable episodic headache, unspecified headache type (Primary)        ED Disposition Condition    Discharge Stable          ED Prescriptions       Medication Sig Dispense Start Date End Date Auth. Provider    butalbital-acetaminophen-caffeine -40 mg (FIORICET, ESGIC) -40 mg per tablet Take 1 tablet by mouth every 4 (four) hours as needed for Headaches. 60 tablet 9/26/2023 -- Carol Rahman PA          Follow-up Information       Follow up With Specialties Details Why Contact Info    Shahida Blum MD Internal Medicine Schedule an appointment as soon as possible for a visit in 2 weeks As needed, If symptoms worsen [address] [facility] [address]  [phone]               Carol Rahman PA  09/26/23 6216

## 2023-09-26 NOTE — DISCHARGE INSTRUCTIONS
Take Fioricet for head pains. Try increasing fluids and avoiding bright lights/ loud noises during headaches.     Recommend follow up with PCP.    Return to ER with worsening symptoms.

## 2023-09-28 ENCOUNTER — TELEPHONE (OUTPATIENT)
Dept: INTERNAL MEDICINE | Facility: CLINIC | Age: 23
End: 2023-09-28
Payer: COMMERCIAL

## 2023-10-03 DIAGNOSIS — F41.1 ANXIETY STATE: ICD-10-CM

## 2023-10-03 RX ORDER — PROPRANOLOL HYDROCHLORIDE 80 MG/1
CAPSULE, EXTENDED RELEASE ORAL
Qty: 90 CAPSULE | Refills: 1 | Status: SHIPPED | OUTPATIENT
Start: 2023-10-03 | End: 2023-12-11 | Stop reason: SDUPTHER

## 2023-10-11 DIAGNOSIS — F90.0 ATTENTION DEFICIT HYPERACTIVITY DISORDER (ADHD), PREDOMINANTLY INATTENTIVE TYPE: ICD-10-CM

## 2023-10-11 RX ORDER — DEXTROAMPHETAMINE SACCHARATE, AMPHETAMINE ASPARTATE, DEXTROAMPHETAMINE SULFATE AND AMPHETAMINE SULFATE 2.5; 2.5; 2.5; 2.5 MG/1; MG/1; MG/1; MG/1
10 TABLET ORAL 2 TIMES DAILY
Qty: 60 TABLET | Refills: 0 | Status: SHIPPED | OUTPATIENT
Start: 2023-10-11 | End: 2023-11-14 | Stop reason: SDUPTHER

## 2023-10-11 NOTE — TELEPHONE ENCOUNTER
----- Message from Allyn Garrett sent at 10/11/2023 10:14 AM CDT -----  .Type:  RX Refill Request    Who Called: pt  Refill or New Rx:refill  RX Name and Strength:dextroamphetamine-amphetamine 10 mg Tab  How is the patient currently taking it? (ex. 1XDay):2x day  Is this a 30 day or 90 day RX:60  [facility]  Local or Mail Order:local  Ordering Provider:Viktoria  Would the patient rather a call back or a response via [facility]?   Best Call Back Number:[phone]  Additional Information: needs refill

## 2023-11-14 DIAGNOSIS — F90.0 ATTENTION DEFICIT HYPERACTIVITY DISORDER (ADHD), PREDOMINANTLY INATTENTIVE TYPE: ICD-10-CM

## 2023-11-14 RX ORDER — DEXTROAMPHETAMINE SACCHARATE, AMPHETAMINE ASPARTATE, DEXTROAMPHETAMINE SULFATE AND AMPHETAMINE SULFATE 2.5; 2.5; 2.5; 2.5 MG/1; MG/1; MG/1; MG/1
10 TABLET ORAL 2 TIMES DAILY
Qty: 60 TABLET | Refills: 0 | Status: SHIPPED | OUTPATIENT
Start: 2023-11-14 | End: 2023-12-11 | Stop reason: SDUPTHER

## 2023-11-14 NOTE — TELEPHONE ENCOUNTER
----- Message from Marianela Diana sent at 11/14/2023 10:46 AM CST -----  Regarding: refill  .Type:  RX Refill Request    Who Called: pt  Refill or New Rx:refill  RX Name and Strength:dextroamphetamine-amphetamine 10 mg Tab  How is the patient currently taking it? (ex. 1XDay):Take 1 tablet (10 mg total) by mouth 2 (two) times a da  Is this a 30 day or 90 day RX:60  Preferred Pharmacy with phone number:[id number] [place] [facility] [address]      Local or Mail Order:  Ordering Provider:  Would the patient rather a call back or a response via [facility]?   Best Call Back Number:  Additional Information:

## 2023-12-05 ENCOUNTER — TELEPHONE (OUTPATIENT)
Dept: INTERNAL MEDICINE | Facility: CLINIC | Age: 23
End: 2023-12-05
Payer: COMMERCIAL

## 2023-12-11 ENCOUNTER — OFFICE VISIT (OUTPATIENT)
Dept: INTERNAL MEDICINE | Facility: CLINIC | Age: 23
End: 2023-12-11
Payer: COMMERCIAL

## 2023-12-11 VITALS — DIASTOLIC BLOOD PRESSURE: 74 MMHG | SYSTOLIC BLOOD PRESSURE: 116 MMHG | WEIGHT: 175 LBS | BODY MASS INDEX: 24.41 KG/M2

## 2023-12-11 DIAGNOSIS — F41.1 ANXIETY STATE: ICD-10-CM

## 2023-12-11 DIAGNOSIS — F90.0 ATTENTION DEFICIT HYPERACTIVITY DISORDER (ADHD), PREDOMINANTLY INATTENTIVE TYPE: Primary | ICD-10-CM

## 2023-12-11 PROCEDURE — 3078F DIAST BP <80 MM HG: CPT | Mod: CPTII,95,,

## 2023-12-11 PROCEDURE — 3074F SYST BP LT 130 MM HG: CPT | Mod: CPTII,95,,

## 2023-12-11 PROCEDURE — 99214 OFFICE O/P EST MOD 30 MIN: CPT | Mod: 95,,,

## 2023-12-11 PROCEDURE — 3008F PR BODY MASS INDEX (BMI) DOCUMENTED: ICD-10-PCS | Mod: CPTII,95,,

## 2023-12-11 PROCEDURE — 1159F PR MEDICATION LIST DOCUMENTED IN MEDICAL RECORD: ICD-10-PCS | Mod: CPTII,95,,

## 2023-12-11 PROCEDURE — 1160F PR REVIEW ALL MEDS BY PRESCRIBER/CLIN PHARMACIST DOCUMENTED: ICD-10-PCS | Mod: CPTII,95,,

## 2023-12-11 PROCEDURE — 3074F PR MOST RECENT SYSTOLIC BLOOD PRESSURE < 130 MM HG: ICD-10-PCS | Mod: CPTII,95,,

## 2023-12-11 PROCEDURE — 99214 PR OFFICE/OUTPT VISIT, EST, LEVL IV, 30-39 MIN: ICD-10-PCS | Mod: 95,,,

## 2023-12-11 PROCEDURE — 1159F MED LIST DOCD IN RCRD: CPT | Mod: CPTII,95,,

## 2023-12-11 PROCEDURE — 3008F BODY MASS INDEX DOCD: CPT | Mod: CPTII,95,,

## 2023-12-11 PROCEDURE — 1160F RVW MEDS BY RX/DR IN RCRD: CPT | Mod: CPTII,95,,

## 2023-12-11 PROCEDURE — 3078F PR MOST RECENT DIASTOLIC BLOOD PRESSURE < 80 MM HG: ICD-10-PCS | Mod: CPTII,95,,

## 2023-12-11 RX ORDER — DEXTROAMPHETAMINE SACCHARATE, AMPHETAMINE ASPARTATE, DEXTROAMPHETAMINE SULFATE AND AMPHETAMINE SULFATE 2.5; 2.5; 2.5; 2.5 MG/1; MG/1; MG/1; MG/1
10 TABLET ORAL 2 TIMES DAILY
Qty: 60 TABLET | Refills: 0 | Status: SHIPPED | OUTPATIENT
Start: 2024-02-07 | End: 2024-02-29 | Stop reason: SDUPTHER

## 2023-12-11 RX ORDER — DEXTROAMPHETAMINE SACCHARATE, AMPHETAMINE ASPARTATE, DEXTROAMPHETAMINE SULFATE AND AMPHETAMINE SULFATE 2.5; 2.5; 2.5; 2.5 MG/1; MG/1; MG/1; MG/1
10 TABLET ORAL 2 TIMES DAILY
Qty: 60 TABLET | Refills: 0 | Status: SHIPPED | OUTPATIENT
Start: 2024-01-09 | End: 2024-01-24 | Stop reason: SDUPTHER

## 2023-12-11 RX ORDER — PROPRANOLOL HYDROCHLORIDE 80 MG/1
80 CAPSULE, EXTENDED RELEASE ORAL DAILY
Qty: 90 CAPSULE | Refills: 1 | Status: SHIPPED | OUTPATIENT
Start: 2024-01-01 | End: 2024-06-29

## 2023-12-11 RX ORDER — DEXTROAMPHETAMINE SACCHARATE, AMPHETAMINE ASPARTATE, DEXTROAMPHETAMINE SULFATE AND AMPHETAMINE SULFATE 2.5; 2.5; 2.5; 2.5 MG/1; MG/1; MG/1; MG/1
10 TABLET ORAL 2 TIMES DAILY
Qty: 60 TABLET | Refills: 0 | Status: SHIPPED | OUTPATIENT
Start: 2023-12-11 | End: 2024-01-10

## 2023-12-11 NOTE — PROGRESS NOTES
Patient ID: Trey Slaughter is a 23 y.o. male.    Chief Complaint: Follow-up (Pt states he is doing well but has concerns about his blood circulation )      The patient location is: Home  Visit type: audiovisual    Face to Face time with patient: 25 minutes     35 minutes of total time spent on the encounter, which includes face to face time and non-face to face time preparing to see the patient (eg, review of tests), Obtaining and/or reviewing separately obtained history, Documenting clinical information in the electronic or other health record, Independently interpreting results (not separately reported) and communicating results to the patient/family/caregiver, or Care coordination (not separately reported).     Each patient to whom he or she provides medical services by telemedicine is:  (1) informed of the relationship between the physician and patient and the respective role of any other health care provider with respect to management of the patient; and (2) notified that he or she may decline to receive medical services by telemedicine and may withdraw from such care at any time.      23-year-old male here today for a three-month follow-up visit.  Medical comorbidities include anxiety, ADHD.  Previously on extended-release Adderall formulary, however switched to and stent release formulary due to afternoon jitter/trouble sleeping.   Since last visit patient reports he has been fairly well without acute injury or major illness.  Anxiety well-controlled currently on propranolol.  Is still currently enrolled in the [employer] and reports managing anxiety/stress well.  Does have some concerns regarding occasionally cool hands and feet, for which we discussed side effect profile of Adderall.  Patient wishing to continue with Adderall despite and will inform clinic should symptoms worsen or progress.  Immunizations discussed, however patient wishing to hold off on influenza vaccine.  Otherwise patient is  fairly well without any acute medical concerns today.     Wellness: 8/16/22          MEDICAL HISTORY:    Past Medical History:   Diagnosis Date    Anxiety state     Attention-deficit hyperactivity disorder, unspecified type     Hyperbilirubinemia     Low back pain     Pityriasis versicolor       Past Surgical History:   Procedure Laterality Date    JOINT REPLACEMENT  December of 2018    AC Joint    Shoulder joint operation N/A     TONSILLECTOMY      I dont remember when      Social History     Tobacco Use    Smoking status: Never    Smokeless tobacco: Never   Substance Use Topics    Alcohol use: Never    Drug use: Not Currently          Health Maintenance Due   Topic Date Due    Hepatitis C Screening  Never done    HPV Vaccines (2 - Male 3-dose series) 06/12/2019    Influenza Vaccine (1) Never done    COVID-19 Vaccine (3 - 2023-24 season) 09/01/2023          Patient Care Team:  Shahida Blum MD as PCP - General (Internal Medicine)      Review of Systems   Constitutional:  Negative for fatigue and fever.   HENT:  Negative for congestion, rhinorrhea, sore throat and trouble swallowing.    Eyes:  Negative for redness and visual disturbance.   Respiratory:  Negative for cough, chest tightness and shortness of breath.    Cardiovascular:  Negative for chest pain and palpitations.   Gastrointestinal:  Negative for abdominal pain, constipation, diarrhea, nausea and vomiting.   Genitourinary:  Negative for dysuria, flank pain, frequency and urgency.   Musculoskeletal:  Negative for arthralgias, gait problem and myalgias.   Skin:  Negative for rash and wound.   Neurological:  Negative for facial asymmetry, speech difficulty, weakness and headaches.   All other systems reviewed and are negative.      Objective:   /74   Wt 79.4 kg (175 lb)   BMI 24.41 kg/m²      Physical Exam      **No physical exam completed due to telemedicine format    Assessment:       ICD-10-CM ICD-9-CM   1. Anxiety state  F41.1 300.00   2.  Attention deficit hyperactivity disorder (ADHD), predominantly inattentive type  F90.0 314.00        Plan:     Problem List Items Addressed This Visit          Psychiatric    Attention deficit hyperactivity disorder (ADHD) (Chronic)     -stable   -currently on Adderall 10 mg b.i.d., continue  -does have some Raynaud's symptoms presumably from Adderall, mild declining intervention         Relevant Medications    dextroamphetamine-amphetamine 10 mg Tab (Start on 2/7/2024)    dextroamphetamine-amphetamine (ADDERALL) 10 mg Tab (Start on 1/9/2024)    dextroamphetamine-amphetamine (ADDERALL) 10 mg Tab    Anxiety state (Chronic)     -stable   -currently on propranolol doing well, continue         Relevant Medications    propranoloL (INDERAL [place]) 80 MG 24 hr capsule (Start on 1/1/2024)          No follow-ups on file.   -plan specifics discussed above    Orders Placed This Encounter    propranoloL (INDERAL [place]) 80 MG 24 hr capsule    dextroamphetamine-amphetamine 10 mg Tab    dextroamphetamine-amphetamine (ADDERALL) 10 mg Tab    dextroamphetamine-amphetamine (ADDERALL) 10 mg Tab        Medication List with Changes/Refills   New Medications    DEXTROAMPHETAMINE-AMPHETAMINE (ADDERALL) 10 MG TAB    Take 1 tablet (10 mg total) by mouth 2 (two) times a day.    DEXTROAMPHETAMINE-AMPHETAMINE (ADDERALL) 10 MG TAB    Take 1 tablet (10 mg total) by mouth 2 (two) times a day.   Changed and/or Refilled Medications    Modified Medication Previous Medication    DEXTROAMPHETAMINE-AMPHETAMINE 10 MG TAB dextroamphetamine-amphetamine 10 mg Tab       Take 1 tablet (10 mg total) by mouth 2 (two) times a day.    Take 1 tablet (10 mg total) by mouth 2 (two) times a day.    PROPRANOLOL (INDERAL [place]) 80 MG 24 HR CAPSULE propranoloL (INDERAL [place]) 80 MG 24 hr capsule       Take 1 capsule (80 mg total) by mouth once daily.    TAKE 1 CAPSULE BY MOUTH EVERY DAY   Discontinued Medications    BUTALBITAL-ACETAMINOPHEN-CAFFEINE -40 MG (FIORICET, ESGIC)  -40 MG PER TABLET    Take 1 tablet by mouth every 4 (four) hours as needed for Headaches.

## 2023-12-11 NOTE — ASSESSMENT & PLAN NOTE
-stable   -currently on Adderall 10 mg b.i.d., continue  -does have some Raynaud's symptoms presumably from Adderall, mild declining intervention

## 2023-12-18 NOTE — PROGRESS NOTES
Patient ID: Trey Slaughter is a 23 y.o. male.    Chief Complaint: Follow-up (Pt states he is doing well but has concerns about his blood circulation )      The patient location is: Home  Visit type: audiovisual    Face to Face time with patient: 25 minutes     35 minutes of total time spent on the encounter, which includes face to face time and non-face to face time preparing to see the patient (eg, review of tests), Obtaining and/or reviewing separately obtained history, Documenting clinical information in the electronic or other health record, Independently interpreting results (not separately reported) and communicating results to the patient/family/caregiver, or Care coordination (not separately reported).     Each patient to whom he or she provides medical services by telemedicine is:  (1) informed of the relationship between the physician and patient and the respective role of any other health care provider with respect to management of the patient; and (2) notified that he or she may decline to receive medical services by telemedicine and may withdraw from such care at any time.      23-year-old male here today for a three-month follow-up visit.  Medical comorbidities include anxiety, ADHD.  Previously on extended-release Adderall formulary, however switched to and stent release formulary due to afternoon jitter/trouble sleeping.   Since last visit patient reports he has been fairly well without acute injury or major illness.  Anxiety well-controlled currently on propranolol.  Is still currently enrolled in the [employer] and reports managing anxiety/stress well.  Does have some concerns regarding occasionally cool hands and feet, for which we discussed side effect profile of Adderall.  Patient wishing to continue with Adderall despite and will inform clinic should symptoms worsen or progress.  Immunizations discussed, however patient wishing to hold off on influenza vaccine.  Otherwise patient is  fairly well without any acute medical concerns today.     Wellness: 8/16/22          MEDICAL HISTORY:    Past Medical History:   Diagnosis Date    Anxiety state     Attention-deficit hyperactivity disorder, unspecified type     Hyperbilirubinemia     Low back pain     Pityriasis versicolor       Past Surgical History:   Procedure Laterality Date    JOINT REPLACEMENT  December of 2018    AC Joint    Shoulder joint operation N/A     TONSILLECTOMY      I dont remember when      Social History     Tobacco Use    Smoking status: Never    Smokeless tobacco: Never   Substance Use Topics    Alcohol use: Never    Drug use: Not Currently          Health Maintenance Due   Topic Date Due    Hepatitis C Screening  Never done    HPV Vaccines (2 - Male 3-dose series) 06/12/2019    Influenza Vaccine (1) Never done    COVID-19 Vaccine (3 - 2023-24 season) 09/01/2023          Patient Care Team:  Shahida Blum MD as PCP - General (Internal Medicine)      Review of Systems   Constitutional:  Negative for fatigue and fever.   HENT:  Negative for congestion, rhinorrhea, sore throat and trouble swallowing.    Eyes:  Negative for redness and visual disturbance.   Respiratory:  Negative for cough, chest tightness and shortness of breath.    Cardiovascular:  Negative for chest pain and palpitations.   Gastrointestinal:  Negative for abdominal pain, constipation, diarrhea, nausea and vomiting.   Genitourinary:  Negative for dysuria, flank pain, frequency and urgency.   Musculoskeletal:  Negative for arthralgias, gait problem and myalgias.   Skin:  Negative for rash and wound.   Neurological:  Negative for facial asymmetry, speech difficulty, weakness and headaches.   All other systems reviewed and are negative.      Objective:   /74   Wt 79.4 kg (175 lb)   BMI 24.41 kg/m²      Physical Exam      **No physical exam completed due to telemedicine format    Assessment:       ICD-10-CM ICD-9-CM   1. Attention deficit hyperactivity  disorder (ADHD), predominantly inattentive type  F90.0 314.00   2. Anxiety state  F41.1 300.00        Plan:     Problem List Items Addressed This Visit          Psychiatric    Attention deficit hyperactivity disorder (ADHD) - Primary (Chronic)     -stable   -currently on Adderall 10 mg b.i.d., continue  -does have some Raynaud's symptoms presumably from Adderall, mild declining intervention         Relevant Medications    dextroamphetamine-amphetamine 10 mg Tab (Start on 2/7/2024)    dextroamphetamine-amphetamine (ADDERALL) 10 mg Tab (Start on 1/9/2024)    dextroamphetamine-amphetamine (ADDERALL) 10 mg Tab    Anxiety state (Chronic)     -stable   -currently on propranolol doing well, continue         Relevant Medications    propranoloL (INDERAL [place]) 80 MG 24 hr capsule (Start on 1/1/2024)          Follow up in about 3 months (around 3/11/2024) for ADD.   -plan specifics discussed above    Orders Placed This Encounter    propranoloL (INDERAL [place]) 80 MG 24 hr capsule    dextroamphetamine-amphetamine 10 mg Tab    dextroamphetamine-amphetamine (ADDERALL) 10 mg Tab    dextroamphetamine-amphetamine (ADDERALL) 10 mg Tab        Medication List with Changes/Refills   New Medications    DEXTROAMPHETAMINE-AMPHETAMINE (ADDERALL) 10 MG TAB    Take 1 tablet (10 mg total) by mouth 2 (two) times a day.    DEXTROAMPHETAMINE-AMPHETAMINE (ADDERALL) 10 MG TAB    Take 1 tablet (10 mg total) by mouth 2 (two) times a day.   Changed and/or Refilled Medications    Modified Medication Previous Medication    DEXTROAMPHETAMINE-AMPHETAMINE 10 MG TAB dextroamphetamine-amphetamine 10 mg Tab       Take 1 tablet (10 mg total) by mouth 2 (two) times a day.    Take 1 tablet (10 mg total) by mouth 2 (two) times a day.    PROPRANOLOL (INDERAL [place]) 80 MG 24 HR CAPSULE propranoloL (INDERAL [place]) 80 MG 24 hr capsule       Take 1 capsule (80 mg total) by mouth once daily.    TAKE 1 CAPSULE BY MOUTH EVERY DAY   Discontinued Medications     BUTALBITAL-ACETAMINOPHEN-CAFFEINE -40 MG (FIORICET, ESGIC) -40 MG PER TABLET    Take 1 tablet by mouth every 4 (four) hours as needed for Headaches.

## 2023-12-28 ENCOUNTER — TELEPHONE (OUTPATIENT)
Dept: INTERNAL MEDICINE | Facility: CLINIC | Age: 23
End: 2023-12-28
Payer: COMMERCIAL

## 2023-12-28 NOTE — TELEPHONE ENCOUNTER
----- Message from Rik Bustillo sent at 12/28/2023  9:30 AM CST -----  .Type:  Patient Returning Call    Who Called:pt   Who Left Message for Patient:  Does the patient know what this is regarding?:requesting mri   Would the patient rather a call back or a response via [facility]? Call back   Best Call Back Number:[phone]  Additional Information: Pt is requesting an MRI of the brain because of seizures that the pt has been having lately

## 2024-01-02 NOTE — TELEPHONE ENCOUNTER
Tried to reach out. Unable to reach   Plastic Surgeon Procedure Text (B): After obtaining clear surgical margins the patient was sent to plastics for surgical repair.

## 2024-01-24 DIAGNOSIS — F90.0 ATTENTION DEFICIT HYPERACTIVITY DISORDER (ADHD), PREDOMINANTLY INATTENTIVE TYPE: ICD-10-CM

## 2024-01-24 RX ORDER — DEXTROAMPHETAMINE SACCHARATE, AMPHETAMINE ASPARTATE, DEXTROAMPHETAMINE SULFATE AND AMPHETAMINE SULFATE 2.5; 2.5; 2.5; 2.5 MG/1; MG/1; MG/1; MG/1
10 TABLET ORAL 2 TIMES DAILY
Qty: 60 TABLET | Refills: 0 | Status: SHIPPED | OUTPATIENT
Start: 2024-01-24 | End: 2024-02-23

## 2024-01-24 NOTE — TELEPHONE ENCOUNTER
----- Message from Halley Peralta sent at 1/24/2024  8:30 AM CST -----  Regarding: refill  Type:  RX Refill Request    Who Called: pt  Refill or New Rx:refill  RX Name and Strength:dextroamphetamine-amphetamine 10 mg Tab  How is the patient currently taking it? (ex. 1XDay):2 x day  Is this a 30 day or 90 day RX:30  Preferred Pharmacy with phone number:[facility] [address]  Local or Mail Order:local  Ordering Provider:dr lu  Would the patient rather a call back or a response via [facility]? C/b  Best Call Back Number:[phone]  Additional Information:

## 2024-02-29 DIAGNOSIS — F90.0 ATTENTION DEFICIT HYPERACTIVITY DISORDER (ADHD), PREDOMINANTLY INATTENTIVE TYPE: ICD-10-CM

## 2024-02-29 RX ORDER — DEXTROAMPHETAMINE SACCHARATE, AMPHETAMINE ASPARTATE, DEXTROAMPHETAMINE SULFATE AND AMPHETAMINE SULFATE 2.5; 2.5; 2.5; 2.5 MG/1; MG/1; MG/1; MG/1
10 TABLET ORAL 2 TIMES DAILY
Qty: 60 TABLET | Refills: 0 | Status: SHIPPED | OUTPATIENT
Start: 2024-02-29 | End: 2024-04-02 | Stop reason: SDUPTHER

## 2024-02-29 NOTE — TELEPHONE ENCOUNTER
----- Message from Myriam Murphy sent at 2/29/2024  8:27 AM CST -----  .Type:  RX Refill Request    Who Called: Trey  Refill or New Rx:Refill  RX Name and Strength:dextroamphetamine-amphetamine 10 mg Tab  How is the patient currently taking it? (ex. 1XDay):2/day  Is this a 30 day or 90 day RX:30  [facility]  Local or Mail Order:Local  Ordering Provider:Viktoria  Would the patient rather a call back or a response via [facility]?   Best Call Back Number:[phone]  Additional Information: dextroamphetamine-amphetamine 10 mg Tab        Last office visit and note from Elisabeth faxed to Tricia Jennissen.

## 2024-03-06 DIAGNOSIS — Z00.00 WELLNESS EXAMINATION: Primary | ICD-10-CM

## 2024-03-21 ENCOUNTER — OFFICE VISIT (OUTPATIENT)
Dept: INTERNAL MEDICINE | Facility: CLINIC | Age: 24
End: 2024-03-21
Payer: COMMERCIAL

## 2024-03-21 VITALS
TEMPERATURE: 97 F | SYSTOLIC BLOOD PRESSURE: 122 MMHG | WEIGHT: 181.19 LBS | DIASTOLIC BLOOD PRESSURE: 60 MMHG | OXYGEN SATURATION: 99 % | BODY MASS INDEX: 25.27 KG/M2 | RESPIRATION RATE: 18 BRPM | HEART RATE: 70 BPM

## 2024-03-21 DIAGNOSIS — Z13.6 SCREENING FOR CARDIOVASCULAR, RESPIRATORY, AND GENITOURINARY DISEASES: ICD-10-CM

## 2024-03-21 DIAGNOSIS — Z13.89 SCREENING FOR CARDIOVASCULAR, RESPIRATORY, AND GENITOURINARY DISEASES: ICD-10-CM

## 2024-03-21 DIAGNOSIS — F90.0 ATTENTION DEFICIT HYPERACTIVITY DISORDER (ADHD), PREDOMINANTLY INATTENTIVE TYPE: Primary | Chronic | ICD-10-CM

## 2024-03-21 DIAGNOSIS — Z13.83 SCREENING FOR CARDIOVASCULAR, RESPIRATORY, AND GENITOURINARY DISEASES: ICD-10-CM

## 2024-03-21 DIAGNOSIS — Z13.0 SCREENING FOR ENDOCRINE, NUTRITIONAL, METABOLIC AND IMMUNITY DISORDER: ICD-10-CM

## 2024-03-21 DIAGNOSIS — F12.90 CANNABIS USE DISORDER: ICD-10-CM

## 2024-03-21 DIAGNOSIS — Z13.228 SCREENING FOR ENDOCRINE, NUTRITIONAL, METABOLIC AND IMMUNITY DISORDER: ICD-10-CM

## 2024-03-21 DIAGNOSIS — F41.1 ANXIETY STATE: Chronic | ICD-10-CM

## 2024-03-21 DIAGNOSIS — Z13.21 SCREENING FOR ENDOCRINE, NUTRITIONAL, METABOLIC AND IMMUNITY DISORDER: ICD-10-CM

## 2024-03-21 DIAGNOSIS — R56.9 SEIZURE-LIKE ACTIVITY: ICD-10-CM

## 2024-03-21 DIAGNOSIS — Z79.899 ENCOUNTER FOR LONG-TERM CURRENT USE OF MEDICATION: ICD-10-CM

## 2024-03-21 DIAGNOSIS — Z13.29 SCREENING FOR ENDOCRINE, NUTRITIONAL, METABOLIC AND IMMUNITY DISORDER: ICD-10-CM

## 2024-03-21 PROCEDURE — 1159F MED LIST DOCD IN RCRD: CPT | Mod: CPTII,,,

## 2024-03-21 PROCEDURE — 3078F DIAST BP <80 MM HG: CPT | Mod: CPTII,,,

## 2024-03-21 PROCEDURE — 99214 OFFICE O/P EST MOD 30 MIN: CPT | Mod: ,,,

## 2024-03-21 PROCEDURE — 3008F BODY MASS INDEX DOCD: CPT | Mod: CPTII,,,

## 2024-03-21 PROCEDURE — 3074F SYST BP LT 130 MM HG: CPT | Mod: CPTII,,,

## 2024-03-21 PROCEDURE — 1160F RVW MEDS BY RX/DR IN RCRD: CPT | Mod: CPTII,,,

## 2024-03-21 NOTE — PROGRESS NOTES
Patient ID: Trey Slaughter is a 23 y.o. male.    Chief Complaint: ADHD (Pt states he has been doing well )    23-year-old male here today for a three-month follow-up visit.  Medical comorbidities include anxiety, ADHD.  Previously on extended-release Adderall formulary, however switched to and stent release formulary due to afternoon jitter/trouble sleeping.   Since last visit patient reports he has been fairly well without major illness.  Anxiety well-controlled currently on propranolol.  Is still currently enrolled in the University and reports managing anxiety/stress well.  Does report some possible seizure-like episodes/passing out while urinating in the AM.  Episodes occurred within the last 6 months. Unwitnessed.  No prior history of seizures, both episodes associated with cannabis use.  Denies head trauma, chest pain, palpitations, or shortness a breath.  No recent EEG or CT head.  No new OTC or prescription medications. Per patient, mother is currently setting up patient with neurologist, wishing to hold off on referral/testing for now.  Otherwise patient is fairly well  today.     Wellness: 8/16/22, due          MEDICAL HISTORY:    Past Medical History:   Diagnosis Date    Anxiety state     Attention-deficit hyperactivity disorder, unspecified type     Hyperbilirubinemia     Low back pain     Pityriasis versicolor       Past Surgical History:   Procedure Laterality Date    JOINT REPLACEMENT  December of 2018    AC Joint    Shoulder joint operation N/A     TONSILLECTOMY      I dont remember when      Social History     Tobacco Use    Smoking status: Never    Smokeless tobacco: Never   Substance Use Topics    Alcohol use: Never    Drug use: Not Currently          Health Maintenance Due   Topic Date Due    Hepatitis C Screening  Never done    HPV Vaccines (2 - Male 3-dose series) 06/12/2019    Influenza Vaccine (1) Never done    COVID-19 Vaccine (3 - 2023-24 season) 09/01/2023          Patient Care  Team:  Shahida Blum MD as PCP - General (Internal Medicine)      Review of Systems   Constitutional:  Negative for fatigue and fever.   HENT:  Negative for congestion, rhinorrhea, sore throat and trouble swallowing.    Eyes:  Negative for redness and visual disturbance.   Respiratory:  Negative for cough, chest tightness and shortness of breath.    Cardiovascular:  Negative for chest pain and palpitations.   Gastrointestinal:  Negative for abdominal pain, constipation, diarrhea, nausea and vomiting.   Genitourinary:  Negative for dysuria, flank pain, frequency and urgency.   Musculoskeletal:  Negative for arthralgias, gait problem and myalgias.   Skin:  Negative for rash and wound.   Neurological:  Negative for facial asymmetry, speech difficulty, weakness and headaches.   All other systems reviewed and are negative.      Objective:   /60 (BP Location: Left arm, Patient Position: Sitting, BP Method: Small (Automatic))   Pulse 70   Temp 96.8 °F (36 °C) (Temporal)   Resp 18   Wt 82.2 kg (181 lb 3.2 oz)   SpO2 99%   BMI 25.27 kg/m²      Physical Exam  Constitutional:       General: He is not in acute distress.     Appearance: Normal appearance.   HENT:      Right Ear: Tympanic membrane, ear canal and external ear normal.      Left Ear: Tympanic membrane, ear canal and external ear normal.      Nose: Nose normal.      Mouth/Throat:      Mouth: Mucous membranes are moist.      Pharynx: Oropharynx is clear.   Eyes:      Extraocular Movements: Extraocular movements intact.      Conjunctiva/sclera: Conjunctivae normal.      Pupils: Pupils are equal, round, and reactive to light.   Cardiovascular:      Rate and Rhythm: Normal rate and regular rhythm.      Pulses: Normal pulses.      Heart sounds: Normal heart sounds. No murmur heard.     No gallop.   Pulmonary:      Effort: Pulmonary effort is normal.      Breath sounds: Normal breath sounds. No wheezing.   Abdominal:      General: Bowel sounds are  normal. There is no distension.      Palpations: Abdomen is soft. There is no mass.      Tenderness: There is no abdominal tenderness. There is no guarding.   Musculoskeletal:         General: Normal range of motion.   Skin:     General: Skin is warm and dry.   Neurological:      Mental Status: He is alert. Mental status is at baseline.      Sensory: No sensory deficit.      Motor: No weakness.           Assessment:       ICD-10-CM ICD-9-CM   1. Attention deficit hyperactivity disorder (ADHD), predominantly inattentive type  F90.0 314.00   2. Anxiety state  F41.1 300.00   3. Cannabis use disorder  F12.90 305.20   4. Seizure-like activity  R56.9 780.39   5. Screening for endocrine, nutritional, metabolic and immunity disorder  Z13.29 V77.99    Z13.21     Z13.228     Z13.0    6. Screening for cardiovascular, respiratory, and genitourinary diseases  Z13.6 V81.2    Z13.89 V81.6    Z13.83 V81.4   7. Encounter for long-term current use of medication  Z79.899 V58.69        Plan:     Problem List Items Addressed This Visit          Neuro    Seizure-like activity     -concerns of   -x2 episodes > 3 months ago  -associated with use of cannabis, discouraged use   -discussed EEG verse neurology referral, patient does have family member reaching out to neurologist wishing to hold off for now  -encouraged to notify clinic if needing coordination         Relevant Orders    TSH    Lipid Panel    Comprehensive Metabolic Panel    CBC Auto Differential    Hemoglobin A1C       Psychiatric    Attention deficit hyperactivity disorder (ADHD) - Primary (Chronic)     -stable   -currently on Adderall 10 mg b.i.d., continue  -notify clinic for new or worsening symptoms   -follow-up q.3 months for refills         Relevant Orders    TSH    Lipid Panel    Comprehensive Metabolic Panel    CBC Auto Differential    Hemoglobin A1C    Anxiety state (Chronic)     -stable   -currently on propranolol doing well, continue         Relevant Orders    TSH     Lipid Panel    Comprehensive Metabolic Panel    CBC Auto Differential    Hemoglobin A1C    Cannabis use disorder     -discouraged use  -possible seizure-like activity associated with         Relevant Orders    TSH    Lipid Panel    Comprehensive Metabolic Panel    CBC Auto Differential    Hemoglobin A1C     Other Visit Diagnoses       Screening for endocrine, nutritional, metabolic and immunity disorder        Relevant Orders    TSH    Lipid Panel    Comprehensive Metabolic Panel    CBC Auto Differential    Hemoglobin A1C    Screening for cardiovascular, respiratory, and genitourinary diseases        Relevant Orders    TSH    Lipid Panel    Comprehensive Metabolic Panel    CBC Auto Differential    Hemoglobin A1C    Encounter for long-term current use of medication        Relevant Orders    TSH    Lipid Panel    Comprehensive Metabolic Panel    CBC Auto Differential    Hemoglobin A1C               Follow up in about 3 months (around 6/21/2024) for Wellness with labs prior to visit, with Dr. Blum.   -plan specifics discussed above    Orders Placed This Encounter    TSH    Lipid Panel    Comprehensive Metabolic Panel    CBC Auto Differential    Hemoglobin A1C        Medication List with Changes/Refills   Current Medications    DEXTROAMPHETAMINE-AMPHETAMINE 10 MG TAB    Take 1 tablet (10 mg total) by mouth 2 (two) times a day.    PROPRANOLOL (INDERAL LA) 80 MG 24 HR CAPSULE    Take 1 capsule (80 mg total) by mouth once daily.

## 2024-03-22 PROBLEM — M54.50 LOW BACK PAIN: Status: RESOLVED | Noted: 2022-05-16 | Resolved: 2024-03-22

## 2024-03-22 PROBLEM — B36.0 TINEA VERSICOLOR: Status: RESOLVED | Noted: 2022-05-16 | Resolved: 2024-03-22

## 2024-03-22 PROBLEM — F12.90 CANNABIS USE DISORDER: Status: ACTIVE | Noted: 2024-03-22

## 2024-03-22 NOTE — ASSESSMENT & PLAN NOTE
-stable   -currently on Adderall 10 mg b.i.d., continue  -notify clinic for new or worsening symptoms   -follow-up q.3 months for refills

## 2024-03-23 PROBLEM — R56.9 SEIZURE-LIKE ACTIVITY: Status: ACTIVE | Noted: 2024-03-23

## 2024-03-23 NOTE — ASSESSMENT & PLAN NOTE
-concerns of   -x2 episodes > 3 months ago  -associated with use of cannabis, discouraged use   -discussed EEG verse neurology referral, patient does have family member reaching out to neurologist wishing to hold off for now  -encouraged to notify clinic if needing coordination

## 2024-04-02 DIAGNOSIS — F90.0 ATTENTION DEFICIT HYPERACTIVITY DISORDER (ADHD), PREDOMINANTLY INATTENTIVE TYPE: ICD-10-CM

## 2024-04-02 RX ORDER — DEXTROAMPHETAMINE SACCHARATE, AMPHETAMINE ASPARTATE, DEXTROAMPHETAMINE SULFATE AND AMPHETAMINE SULFATE 2.5; 2.5; 2.5; 2.5 MG/1; MG/1; MG/1; MG/1
10 TABLET ORAL 2 TIMES DAILY
Qty: 60 TABLET | Refills: 0 | Status: SHIPPED | OUTPATIENT
Start: 2024-04-02 | End: 2024-05-06 | Stop reason: SDUPTHER

## 2024-04-02 NOTE — TELEPHONE ENCOUNTER
----- Message from Rik Bustillo sent at 4/2/2024  9:07 AM CDT -----  .Type:  RX Refill Request    Who Called: pt  Refill or New Rx:refill   RX Name and Strength:dextroamphetamine-amphetamine 10 mg Tab  How is the patient currently taking it? (ex. 1XDay):2x  Is this a 30 day or 90 day RX:30  Preferred Pharmacy with phone number[id number] [place] [facility] [address]  Local or Mail Order:local   Ordering Provider:tabitha   Would the patient rather a call back or a response via [facility]? Call back   Best Call Back Number:[phone]  Additional Information:

## 2024-04-10 ENCOUNTER — TELEPHONE (OUTPATIENT)
Dept: INTERNAL MEDICINE | Facility: CLINIC | Age: 24
End: 2024-04-10
Payer: COMMERCIAL

## 2024-04-10 NOTE — TELEPHONE ENCOUNTER
----- Message from [facility] sent at 4/10/2024 10:25 AM CDT -----  .Type:  Needs Medical Advice    Who Called:  pt    Symptoms (please be specific):  b/p evaluated     How long has patient had these symptoms:   one week    Pharmacy name and phone #:   [facility] on [address]    Would the patient rather a call back or a response via [facility]?      Best Call Back Number:  [phone]    Additional Information: pt states his b/p is evaluating every time he tries to move around numbers go up in the 130 please advise thanks he has an appt on 4-11-24 he wants a nurse to call him

## 2024-04-11 ENCOUNTER — OFFICE VISIT (OUTPATIENT)
Dept: INTERNAL MEDICINE | Facility: CLINIC | Age: 24
End: 2024-04-11
Payer: COMMERCIAL

## 2024-04-11 VITALS
RESPIRATION RATE: 18 BRPM | DIASTOLIC BLOOD PRESSURE: 88 MMHG | TEMPERATURE: 99 F | OXYGEN SATURATION: 98 % | HEART RATE: 75 BPM | WEIGHT: 180.19 LBS | BODY MASS INDEX: 25.13 KG/M2 | SYSTOLIC BLOOD PRESSURE: 134 MMHG

## 2024-04-11 DIAGNOSIS — F41.1 ANXIETY STATE: Primary | Chronic | ICD-10-CM

## 2024-04-11 DIAGNOSIS — R51.9 HEADACHE ABOVE THE EYE REGION: ICD-10-CM

## 2024-04-11 DIAGNOSIS — R03.0 PREHYPERTENSION: ICD-10-CM

## 2024-04-11 DIAGNOSIS — F90.0 ATTENTION DEFICIT HYPERACTIVITY DISORDER (ADHD), PREDOMINANTLY INATTENTIVE TYPE: Chronic | ICD-10-CM

## 2024-04-11 PROCEDURE — 99214 OFFICE O/P EST MOD 30 MIN: CPT | Mod: ,,,

## 2024-04-11 PROCEDURE — 1160F RVW MEDS BY RX/DR IN RCRD: CPT | Mod: CPTII,,,

## 2024-04-11 PROCEDURE — 1159F MED LIST DOCD IN RCRD: CPT | Mod: CPTII,,,

## 2024-04-11 PROCEDURE — 3008F BODY MASS INDEX DOCD: CPT | Mod: CPTII,,,

## 2024-04-11 PROCEDURE — 3079F DIAST BP 80-89 MM HG: CPT | Mod: CPTII,,,

## 2024-04-11 PROCEDURE — 3075F SYST BP GE 130 - 139MM HG: CPT | Mod: CPTII,,,

## 2024-04-11 NOTE — ASSESSMENT & PLAN NOTE
-upper limits of normal  -family history of hypertension with concerns   -all questions answered to best of ability   -recommended low-sodium diet  -encourage routine aerobic exercise   -avoid excessive use of caffeine with Adderall prescription

## 2024-04-11 NOTE — ASSESSMENT & PLAN NOTE
-acute on chronic  -worsened with use of Adderall and excessive caffeine   -currently on propranolol, discussed increasing however wishing to hold off for now  -consider SSRI if persistent

## 2024-04-11 NOTE — PROGRESS NOTES
Patient ID: Trey Slaughter is a 23 y.o. male.    Chief Complaint: Follow-up (B/P follow up, blood pressure stable at home )    23-year-old male here today for a requested visit.  Medical comorbidities include anxiety, ADHD.  Previously on extended-release Adderall formulary, however switched to and stent release formulary due to afternoon jitter/trouble sleeping.   Today presents with complaints of right posterior I headache over this past weekend.  Duration greater than 24 hours.  No nausea/vomiting.  Per patient, headache seemed to have come on after utilizing Adderall with excessive caffeine that day.  Appears to have self resolved today.  Has had severe migraine in the past, however infrequently.  Last episode several months ago.  Has been under increased levels of stress, reports compliance with propranolol.  Discussed anxiety, however somewhat hesitant to start any medication for anxiety desiring to treat with conservative measures for now.  Other concerns today include mildly elevated blood pressures.  Today 134/88, sometimes has spikes in blood pressure with increased stress and caffeine use.  No chest pain, palpitations.Otherwise patient is fairly well  today.     Wellness: 8/16/22, due          MEDICAL HISTORY:    Past Medical History:   Diagnosis Date    Anxiety state     Attention-deficit hyperactivity disorder, unspecified type     Hyperbilirubinemia     Low back pain     Pityriasis versicolor       Past Surgical History:   Procedure Laterality Date    JOINT REPLACEMENT  December of 2018    AC Joint    Shoulder joint operation N/A     TONSILLECTOMY      I dont remember when      Social History     Tobacco Use    Smoking status: Never    Smokeless tobacco: Never   Substance Use Topics    Alcohol use: Never    Drug use: Not Currently          Health Maintenance Due   Topic Date Due    Hepatitis C Screening  Never done    HPV Vaccines (2 - Male 3-dose series) 06/12/2019    Influenza Vaccine (1) Never  done    COVID-19 Vaccine (3 - 2023-24 season) 09/01/2023         Patient Care Team:  Shahida Blum MD as PCP - General (Internal Medicine)      Review of Systems   Constitutional:  Negative for fatigue and fever.   HENT:  Negative for congestion, rhinorrhea, sore throat and trouble swallowing.    Eyes:  Negative for redness and visual disturbance.   Respiratory:  Negative for cough, chest tightness and shortness of breath.    Cardiovascular:  Negative for chest pain and palpitations.   Gastrointestinal:  Negative for abdominal pain, constipation, diarrhea, nausea and vomiting.   Genitourinary:  Negative for dysuria, flank pain, frequency and urgency.   Musculoskeletal:  Negative for arthralgias, gait problem and myalgias.   Skin:  Negative for rash and wound.   Neurological:  Positive for headaches. Negative for facial asymmetry, speech difficulty and weakness.   Psychiatric/Behavioral:  The patient is nervous/anxious.    All other systems reviewed and are negative.      Objective:   /88 (BP Location: Left arm, Patient Position: Sitting, BP Method: Small (Automatic))   Pulse 75   Temp 98.9 °F (37.2 °C) (Temporal)   Resp 18   Wt 81.7 kg (180 lb 3.2 oz)   SpO2 98%   BMI 25.13 kg/m²      Physical Exam  Constitutional:       General: He is not in acute distress.     Appearance: Normal appearance.   HENT:      Right Ear: Tympanic membrane, ear canal and external ear normal.      Left Ear: Tympanic membrane, ear canal and external ear normal.      Nose: Nose normal.      Mouth/Throat:      Mouth: Mucous membranes are moist.      Pharynx: Oropharynx is clear.   Eyes:      Extraocular Movements: Extraocular movements intact.      Conjunctiva/sclera: Conjunctivae normal.      Pupils: Pupils are equal, round, and reactive to light.   Cardiovascular:      Rate and Rhythm: Normal rate and regular rhythm.      Pulses: Normal pulses.      Heart sounds: Normal heart sounds. No murmur heard.     No gallop.    Pulmonary:      Effort: Pulmonary effort is normal.      Breath sounds: Normal breath sounds. No wheezing.   Abdominal:      General: Bowel sounds are normal. There is no distension.      Palpations: Abdomen is soft. There is no mass.      Tenderness: There is no abdominal tenderness. There is no guarding.   Musculoskeletal:         General: Normal range of motion.   Skin:     General: Skin is warm and dry.   Neurological:      Mental Status: He is alert. Mental status is at baseline.      Sensory: No sensory deficit.      Motor: No weakness.   Psychiatric:         Mood and Affect: Mood is anxious.           Assessment:       ICD-10-CM ICD-9-CM   1. Anxiety state  F41.1 300.00   2. Attention deficit hyperactivity disorder (ADHD), predominantly inattentive type  F90.0 314.00   3. Headache above the eye region  R51.9 784.0   4. Prehypertension  R03.0 796.2        Plan:     Problem List Items Addressed This Visit          Neuro    Headache above the eye region     -acute  -currently on propranolol, continue  -discussed Adderall side effect profile   -encouraged to utilize OTC Tylenol/ibuprofen p.r.n.  -trial sample given of Nurtec for migraine            Psychiatric    Attention deficit hyperactivity disorder (ADHD) (Chronic)     -stable   -currently on Adderall 10 mg b.i.d., continue  -avoid excessive caffeine use while on Adderall  -stay appropriately hydrated         Anxiety state - Primary (Chronic)     -acute on chronic  -worsened with use of Adderall and excessive caffeine   -currently on propranolol, discussed increasing however wishing to hold off for now  -consider SSRI if persistent            Cardiac/Vascular    Prehypertension     -upper limits of normal  -family history of hypertension with concerns   -all questions answered to best of ability   -recommended low-sodium diet  -encourage routine aerobic exercise   -avoid excessive use of caffeine with Adderall prescription               Follow up for  Previously scheduled and PRN if need.   -plan specifics discussed above          Medication List with Changes/Refills   Current Medications    DEXTROAMPHETAMINE-AMPHETAMINE 10 MG TAB    Take 1 tablet (10 mg total) by mouth 2 (two) times a day.    PROPRANOLOL (INDERAL LA) 80 MG 24 HR CAPSULE    Take 1 capsule (80 mg total) by mouth once daily.

## 2024-04-11 NOTE — ASSESSMENT & PLAN NOTE
-acute  -currently on propranolol, continue  -discussed Adderall side effect profile   -encouraged to utilize OTC Tylenol/ibuprofen p.r.n.  -trial sample given of Nurtec for migraine

## 2024-04-11 NOTE — ASSESSMENT & PLAN NOTE
-stable   -currently on Adderall 10 mg b.i.d., continue  -avoid excessive caffeine use while on Adderall  -stay appropriately hydrated

## 2024-04-15 ENCOUNTER — TELEPHONE (OUTPATIENT)
Dept: INTERNAL MEDICINE | Facility: CLINIC | Age: 24
End: 2024-04-15
Payer: COMMERCIAL

## 2024-04-15 DIAGNOSIS — M54.50 ACUTE LOW BACK PAIN, UNSPECIFIED BACK PAIN LATERALITY, UNSPECIFIED WHETHER SCIATICA PRESENT: Primary | ICD-10-CM

## 2024-04-15 NOTE — TELEPHONE ENCOUNTER
----- Message from Flor Perdue sent at 4/15/2024  1:54 PM CDT -----  .Type:  Patient Returning Call    Who Called:pt  Who Left Message for Patient:Leisa   Does the patient know what this is regarding?:referral   Would the patient rather a call back or a response via [facility]? Call back   Best Call Back Number:[phone]  Additional Information: pt states he would like to send his referral [facility] in [place]

## 2024-04-15 NOTE — TELEPHONE ENCOUNTER
----- Message from Danielle Simpson LPN sent at 4/15/2024  9:26 AM CDT -----  Regarding: FW: referral request  Please advise.  ----- Message -----  From: Susanne Garza  Sent: 4/15/2024   8:56 AM CDT  To: [facility] Staff  Subject: referral request                                 Type:  Patient Requesting Referral    Who Called:Trey    Does the patient already have the specialty appointment scheduled?:    If yes, what is the date of that appointment?:    Referral to What Specialty:Physical Therapy     Reason for Referral:lower back pain    Does the patient want the referral with a specific physician?:[facility]     Is the specialist an [facility]?:    Patient Requesting a Response?:yes    Would the patient rather a call back or a response via [facility]?      Best Call Back Number:[phone]    Additional Information: Trey stated the physical therapy stated he needed a new referral.

## 2024-04-15 NOTE — TELEPHONE ENCOUNTER
Request [facility] for lower back pain pt states he has some them in the past and would like to go again.

## 2024-04-15 NOTE — TELEPHONE ENCOUNTER
Attempted to contact pt to find out which therapy are we sending referral to was unsuccessful left vm to return call.

## 2024-04-15 NOTE — TELEPHONE ENCOUNTER
----- Message from Susanne Greg sent at 4/15/2024  8:53 AM CDT -----  Regarding: referral request  Type:  Patient Requesting Referral    Who Called:Trey    Does the patient already have the specialty appointment scheduled?:    If yes, what is the date of that appointment?:    Referral to What Specialty:Physical Therapy     Reason for Referral:lower back pain    Does the patient want the referral with a specific physician?:[facility]     Is the specialist [facility]?:    Patient Requesting a Response?:yes    Would the patient rather a call back or a response via [facility]?      Best Call Back Number:[phone]    Additional Information: Trey stated the physical therapy stated he needed a new referral.

## 2024-04-17 ENCOUNTER — PATIENT MESSAGE (OUTPATIENT)
Dept: INTERNAL MEDICINE | Facility: CLINIC | Age: 24
End: 2024-04-17
Payer: COMMERCIAL

## 2024-05-06 DIAGNOSIS — F90.0 ATTENTION DEFICIT HYPERACTIVITY DISORDER (ADHD), PREDOMINANTLY INATTENTIVE TYPE: ICD-10-CM

## 2024-05-06 RX ORDER — DEXTROAMPHETAMINE SACCHARATE, AMPHETAMINE ASPARTATE, DEXTROAMPHETAMINE SULFATE AND AMPHETAMINE SULFATE 2.5; 2.5; 2.5; 2.5 MG/1; MG/1; MG/1; MG/1
10 TABLET ORAL 2 TIMES DAILY
Qty: 60 TABLET | Refills: 0 | Status: SHIPPED | OUTPATIENT
Start: 2024-05-06 | End: 2024-06-11 | Stop reason: SDUPTHER

## 2024-05-06 NOTE — TELEPHONE ENCOUNTER
----- Message from Myriam Murphy sent at 5/6/2024  8:18 AM CDT -----  .Type:  RX Refill Request    Who Called: pt  Refill or New Rx:refill  RX Name and Strength:dextroamphetamine-amphetamine 10 mg Tab  How is the patient currently taking it? (ex. 1XDay):2/day  Is this a 30 day or 90 day RX:30  Preferred Pharmacy with phone number:[facility] on [address]  Local or Mail Order:Local  Ordering Provider:Viktoria  Would the patient rather a call back or a response via [facility]?   Best Call Back Number:[phone]  Additional Information: dextroamphetamine-amphetamine 10 mg Tab

## 2024-06-11 DIAGNOSIS — F90.0 ATTENTION DEFICIT HYPERACTIVITY DISORDER (ADHD), PREDOMINANTLY INATTENTIVE TYPE: ICD-10-CM

## 2024-06-11 RX ORDER — DEXTROAMPHETAMINE SACCHARATE, AMPHETAMINE ASPARTATE, DEXTROAMPHETAMINE SULFATE AND AMPHETAMINE SULFATE 2.5; 2.5; 2.5; 2.5 MG/1; MG/1; MG/1; MG/1
10 TABLET ORAL 2 TIMES DAILY
Qty: 60 TABLET | Refills: 0 | Status: SHIPPED | OUTPATIENT
Start: 2024-06-11 | End: 2024-07-11

## 2024-06-11 NOTE — TELEPHONE ENCOUNTER
----- Message from Liya Roberts sent at 6/11/2024  9:28 AM CDT -----  Who Called: Trey Slaughter    Refill or New Rx:Refill  RX Name and Strength:dextroamphetamine-amphetamine 10 mg Tab   How is the patient currently taking it? (ex. 1XDay): one day  Is this a 30 day or 90 day RX:60 tablet  Local or Mail Order: local  List of preferred pharmac[facility] on file (remove unneeded): [facility]  If different Pharmacy is requested, enter Pharmacy information here including location and phone number:  [facility]   Ordering Provider: tabitha      Preferred Method of Contact: Phone Call  Patient's Preferred Phone Number on File: [phone]   Best Call Back Number, if different:  Additional Information:  refill request, please advise, thanks

## 2024-06-27 ENCOUNTER — LAB VISIT (OUTPATIENT)
Dept: LAB | Facility: HOSPITAL | Age: 24
End: 2024-06-27
Payer: COMMERCIAL

## 2024-06-27 DIAGNOSIS — Z13.29 SCREENING FOR ENDOCRINE, NUTRITIONAL, METABOLIC AND IMMUNITY DISORDER: ICD-10-CM

## 2024-06-27 DIAGNOSIS — Z13.228 SCREENING FOR ENDOCRINE, NUTRITIONAL, METABOLIC AND IMMUNITY DISORDER: ICD-10-CM

## 2024-06-27 DIAGNOSIS — F12.90 CANNABIS USE DISORDER: ICD-10-CM

## 2024-06-27 DIAGNOSIS — Z13.0 SCREENING FOR ENDOCRINE, NUTRITIONAL, METABOLIC AND IMMUNITY DISORDER: ICD-10-CM

## 2024-06-27 DIAGNOSIS — Z13.21 SCREENING FOR ENDOCRINE, NUTRITIONAL, METABOLIC AND IMMUNITY DISORDER: ICD-10-CM

## 2024-06-27 DIAGNOSIS — R56.9 SEIZURE-LIKE ACTIVITY: ICD-10-CM

## 2024-06-27 DIAGNOSIS — Z13.89 SCREENING FOR CARDIOVASCULAR, RESPIRATORY, AND GENITOURINARY DISEASES: ICD-10-CM

## 2024-06-27 DIAGNOSIS — F41.1 ANXIETY STATE: ICD-10-CM

## 2024-06-27 DIAGNOSIS — F90.0 ATTENTION DEFICIT HYPERACTIVITY DISORDER (ADHD), PREDOMINANTLY INATTENTIVE TYPE: ICD-10-CM

## 2024-06-27 DIAGNOSIS — Z13.83 SCREENING FOR CARDIOVASCULAR, RESPIRATORY, AND GENITOURINARY DISEASES: ICD-10-CM

## 2024-06-27 DIAGNOSIS — Z13.6 SCREENING FOR CARDIOVASCULAR, RESPIRATORY, AND GENITOURINARY DISEASES: ICD-10-CM

## 2024-06-27 DIAGNOSIS — Z79.899 ENCOUNTER FOR LONG-TERM CURRENT USE OF MEDICATION: ICD-10-CM

## 2024-06-27 LAB
ALBUMIN SERPL-MCNC: 4.5 G/DL (ref 3.5–5)
ALBUMIN/GLOB SERPL: 1.9 RATIO (ref 1.1–2)
ALP SERPL-CCNC: 60 UNIT/L (ref 40–150)
ALT SERPL-CCNC: 14 UNIT/L (ref 0–55)
ANION GAP SERPL CALC-SCNC: 8 MEQ/L
AST SERPL-CCNC: 19 UNIT/L (ref 5–34)
BASOPHILS # BLD AUTO: 0.04 X10(3)/MCL
BASOPHILS NFR BLD AUTO: 0.7 %
BILIRUB SERPL-MCNC: 1.9 MG/DL
BUN SERPL-MCNC: 13.9 MG/DL (ref 8.9–20.6)
CALCIUM SERPL-MCNC: 9.4 MG/DL (ref 8.4–10.2)
CHLORIDE SERPL-SCNC: 108 MMOL/L (ref 98–107)
CHOLEST SERPL-MCNC: 116 MG/DL
CHOLEST/HDLC SERPL: 3 {RATIO} (ref 0–5)
CO2 SERPL-SCNC: 24 MMOL/L (ref 22–29)
CREAT SERPL-MCNC: 0.89 MG/DL (ref 0.73–1.18)
CREAT/UREA NIT SERPL: 16
EOSINOPHIL # BLD AUTO: 0.2 X10(3)/MCL (ref 0–0.9)
EOSINOPHIL NFR BLD AUTO: 3.5 %
ERYTHROCYTE [DISTWIDTH] IN BLOOD BY AUTOMATED COUNT: 11.8 % (ref 11.5–17)
EST. AVERAGE GLUCOSE BLD GHB EST-MCNC: 85.3 MG/DL
GFR SERPLBLD CREATININE-BSD FMLA CKD-EPI: >60 ML/MIN/1.73/M2
GLOBULIN SER-MCNC: 2.4 GM/DL (ref 2.4–3.5)
GLUCOSE SERPL-MCNC: 91 MG/DL (ref 74–100)
HBA1C MFR BLD: 4.6 %
HCT VFR BLD AUTO: 45 % (ref 42–52)
HDLC SERPL-MCNC: 35 MG/DL (ref 35–60)
HGB BLD-MCNC: 15.7 G/DL (ref 14–18)
IMM GRANULOCYTES # BLD AUTO: 0.01 X10(3)/MCL (ref 0–0.04)
IMM GRANULOCYTES NFR BLD AUTO: 0.2 %
LDLC SERPL CALC-MCNC: 63 MG/DL (ref 50–140)
LYMPHOCYTES # BLD AUTO: 1.88 X10(3)/MCL (ref 0.6–4.6)
LYMPHOCYTES NFR BLD AUTO: 32.7 %
MCH RBC QN AUTO: 30.7 PG (ref 27–31)
MCHC RBC AUTO-ENTMCNC: 34.9 G/DL (ref 33–36)
MCV RBC AUTO: 88.1 FL (ref 80–94)
MONOCYTES # BLD AUTO: 0.64 X10(3)/MCL (ref 0.1–1.3)
MONOCYTES NFR BLD AUTO: 11.1 %
NEUTROPHILS # BLD AUTO: 2.98 X10(3)/MCL (ref 2.1–9.2)
NEUTROPHILS NFR BLD AUTO: 51.8 %
NRBC BLD AUTO-RTO: 0 %
PLATELET # BLD AUTO: 201 X10(3)/MCL (ref 130–400)
PMV BLD AUTO: 10.4 FL (ref 7.4–10.4)
POTASSIUM SERPL-SCNC: 4.4 MMOL/L (ref 3.5–5.1)
PROT SERPL-MCNC: 6.9 GM/DL (ref 6.4–8.3)
RBC # BLD AUTO: 5.11 X10(6)/MCL (ref 4.7–6.1)
SODIUM SERPL-SCNC: 140 MMOL/L (ref 136–145)
TRIGL SERPL-MCNC: 88 MG/DL (ref 34–140)
TSH SERPL-ACNC: 2.29 UIU/ML (ref 0.35–4.94)
VLDLC SERPL CALC-MCNC: 18 MG/DL
WBC # BLD AUTO: 5.75 X10(3)/MCL (ref 4.5–11.5)

## 2024-06-27 PROCEDURE — 80061 LIPID PANEL: CPT

## 2024-06-27 PROCEDURE — 84443 ASSAY THYROID STIM HORMONE: CPT

## 2024-06-27 PROCEDURE — 83036 HEMOGLOBIN GLYCOSYLATED A1C: CPT

## 2024-06-27 PROCEDURE — 80053 COMPREHEN METABOLIC PANEL: CPT

## 2024-06-27 PROCEDURE — 36415 COLL VENOUS BLD VENIPUNCTURE: CPT

## 2024-06-27 PROCEDURE — 85025 COMPLETE CBC W/AUTO DIFF WBC: CPT

## 2024-07-02 ENCOUNTER — OFFICE VISIT (OUTPATIENT)
Dept: INTERNAL MEDICINE | Facility: CLINIC | Age: 24
End: 2024-07-02
Payer: COMMERCIAL

## 2024-07-02 VITALS
HEIGHT: 71 IN | BODY MASS INDEX: 24.92 KG/M2 | WEIGHT: 178 LBS | OXYGEN SATURATION: 98 % | HEART RATE: 78 BPM | DIASTOLIC BLOOD PRESSURE: 78 MMHG | SYSTOLIC BLOOD PRESSURE: 116 MMHG

## 2024-07-02 DIAGNOSIS — F41.1 ANXIETY STATE: Chronic | ICD-10-CM

## 2024-07-02 DIAGNOSIS — F90.0 ATTENTION DEFICIT HYPERACTIVITY DISORDER (ADHD), PREDOMINANTLY INATTENTIVE TYPE: Chronic | ICD-10-CM

## 2024-07-02 DIAGNOSIS — Z00.00 WELLNESS EXAMINATION: Primary | ICD-10-CM

## 2024-07-02 PROCEDURE — 3044F HG A1C LEVEL LT 7.0%: CPT | Mod: CPTII,,, | Performed by: INTERNAL MEDICINE

## 2024-07-02 PROCEDURE — 3078F DIAST BP <80 MM HG: CPT | Mod: CPTII,,, | Performed by: INTERNAL MEDICINE

## 2024-07-02 PROCEDURE — 3074F SYST BP LT 130 MM HG: CPT | Mod: CPTII,,, | Performed by: INTERNAL MEDICINE

## 2024-07-02 PROCEDURE — 3008F BODY MASS INDEX DOCD: CPT | Mod: CPTII,,, | Performed by: INTERNAL MEDICINE

## 2024-07-02 PROCEDURE — 99395 PREV VISIT EST AGE 18-39: CPT | Mod: ,,, | Performed by: INTERNAL MEDICINE

## 2024-07-02 PROCEDURE — 1160F RVW MEDS BY RX/DR IN RCRD: CPT | Mod: CPTII,,, | Performed by: INTERNAL MEDICINE

## 2024-07-02 PROCEDURE — 1159F MED LIST DOCD IN RCRD: CPT | Mod: CPTII,,, | Performed by: INTERNAL MEDICINE

## 2024-07-02 NOTE — PROGRESS NOTES
Subjective:      Patient ID: Trey Slaughter is a 23 y.o. male.    Chief Complaint: Annual Exam (Wellness)    23-year-old male here today for a wellness visit.  Medical comorbidities include anxiety, ADHD.    Has been evaluated by a therapist and was given the diagnosis of being on the spectrum of autism.  Quite high functioning however trying to work on getting accommodations for school etc..    No acute needs or complaints as such today except for some low back pain for which he is in physical therapy.  Stretching regularly has been advised   Doing well with the Adderall 10 mg p.o. b.i.d..        Wellness: 8/16/22, due       The patient's Health Maintenance was reviewed and the following appears to be due at this time:   Health Maintenance Due   Topic Date Due    Hepatitis C Screening  Never done    HPV Vaccines (2 - Male 3-dose series) 06/12/2019    COVID-19 Vaccine (3 - 2023-24 season) 09/01/2023        Past Medical History:  Past Medical History:   Diagnosis Date    Anxiety state     Attention-deficit hyperactivity disorder, unspecified type     Hyperbilirubinemia     Low back pain     Pityriasis versicolor      Past Surgical History:   Procedure Laterality Date    JOINT REPLACEMENT  December of 2018    AC Joint    Shoulder joint operation N/A     TONSILLECTOMY      I dont remember when     Review of patient's allergies indicates:  No Known Allergies  Social History     Socioeconomic History    Marital status: Single   Tobacco Use    Smoking status: Never    Smokeless tobacco: Never   Substance and Sexual Activity    Alcohol use: Never    Drug use: Not Currently     Social Determinants of Health     Financial Resource Strain: Low Risk  (7/2/2024)    Overall Financial Resource Strain (CARDIA)     Difficulty of Paying Living Expenses: Not hard at all   Food Insecurity: No Food Insecurity (7/2/2024)    Hunger Vital Sign     Worried About Running Out of Food in the Last Year: Never true     Ran Out of Food in the  "Last Year: Never true   Transportation Needs: No Transportation Needs (7/2/2024)    TRANSPORTATION NEEDS     Transportation : No   Physical Activity: Insufficiently Active (7/2/2024)    Exercise Vital Sign     Days of Exercise per Week: 3 days     Minutes of Exercise per Session: 30 min   Stress: No Stress Concern Present (7/2/2024)    [employer] - Occupational Stress Questionnaire     Feeling of Stress : Not at all   Housing Stability: Low Risk  (7/2/2024)    Housing Stability Vital Sign     Unable to Pay for Housing in the Last Year: No     Homeless in the Last Year: No     Family History   Problem Relation Name Age of Onset    Hypertension Mother Radha Slaughter        Review of Systems    A comprehensive review of systems was performed and is negative except for that stated above  Objective:   /78 (BP Location: Left arm, Patient Position: Sitting, BP Method: Small (Manual))   Pulse 78   Ht 5' 11" (1.803 m)   Wt 80.7 kg (178 lb)   SpO2 98%   BMI 24.83 kg/m²     Physical Exam  Constitutional:       Appearance: Normal appearance.   HENT:      Head: Normocephalic and atraumatic.      Nose: Nose normal.      Mouth/Throat:      Mouth: Mucous membranes are moist.      Pharynx: Oropharynx is clear.   Eyes:      Extraocular Movements: Extraocular movements intact.      Pupils: Pupils are equal, round, and reactive to light.   Cardiovascular:      Rate and Rhythm: Normal rate and regular rhythm.      Pulses: Normal pulses.   Pulmonary:      Effort: Pulmonary effort is normal.      Breath sounds: Normal breath sounds.   Abdominal:      General: Bowel sounds are normal.      Palpations: Abdomen is soft.   Musculoskeletal:         General: Normal range of motion.      Cervical back: Normal range of motion and neck supple.   Skin:     General: Skin is warm.   Neurological:      General: No focal deficit present.      Mental Status: He is alert and oriented to person, place, and time. " Mental status is at baseline.   Psychiatric:         Mood and Affect: Mood normal.       Assessment/ Plan:   1. Wellness examination  Assessment & Plan:  -labs are reviewed all essentially normal  -patient is advised on importance of watching his carbohydrate intake and saturated fat intake, making the right nutritional choices and exercising on a regular basis  -up-to-date with the screening       2. Attention deficit hyperactivity disorder (ADHD), predominantly inattentive type  Assessment & Plan:  -stable   -currently on Adderall 10 mg b.i.d., continue  -avoid excessive caffeine use while on Adderall  -stay appropriately hydrated         3. Anxiety state  Assessment & Plan:  -seems to be doing well with propranolol, continue

## 2024-07-02 NOTE — ASSESSMENT & PLAN NOTE
-labs are reviewed all essentially normal  -patient is advised on importance of watching his carbohydrate intake and saturated fat intake, making the right nutritional choices and exercising on a regular basis  -up-to-date with the screening

## 2024-07-15 DIAGNOSIS — F90.0 ATTENTION DEFICIT HYPERACTIVITY DISORDER (ADHD), PREDOMINANTLY INATTENTIVE TYPE: ICD-10-CM

## 2024-07-15 RX ORDER — DEXTROAMPHETAMINE SACCHARATE, AMPHETAMINE ASPARTATE, DEXTROAMPHETAMINE SULFATE AND AMPHETAMINE SULFATE 2.5; 2.5; 2.5; 2.5 MG/1; MG/1; MG/1; MG/1
10 TABLET ORAL 2 TIMES DAILY
Qty: 60 TABLET | Refills: 0 | Status: SHIPPED | OUTPATIENT
Start: 2024-07-15 | End: 2024-08-14

## 2024-07-15 NOTE — TELEPHONE ENCOUNTER
----- Message from Allyn Garrett sent at 7/15/2024 10:43 AM CDT -----  .Who Called: Trey Slaughter    Refill or New Rx:Refill  RX Name and Strength:dextroamphetamine-amphetamine 10 mg Tab  How is the patient currently taking it? (ex. 1XDay):2x day  Is this a 30 day or 90 day RX:60  Local or Mail Order:locasl  List of preferred pharmacies on file (remove unneeded): [unfilled]  If different Pharmacy is requested, enter Pharmacy information here including location and phone number: same   Ordering Provider:      Preferred Method of Contact: Phone Call  Patient's Preferred Phone Number on File: [phone]   Best Call Back Number, if different:  Additional Information: need refill

## 2024-07-17 DIAGNOSIS — F41.1 ANXIETY STATE: ICD-10-CM

## 2024-07-17 RX ORDER — PROPRANOLOL HYDROCHLORIDE 80 MG/1
80 CAPSULE, EXTENDED RELEASE ORAL
Qty: 90 CAPSULE | Refills: 1 | Status: SHIPPED | OUTPATIENT
Start: 2024-07-17

## 2024-08-20 ENCOUNTER — TELEPHONE (OUTPATIENT)
Dept: INTERNAL MEDICINE | Facility: CLINIC | Age: 24
End: 2024-08-20
Payer: COMMERCIAL

## 2024-08-20 DIAGNOSIS — F90.0 ATTENTION DEFICIT HYPERACTIVITY DISORDER (ADHD), PREDOMINANTLY INATTENTIVE TYPE: ICD-10-CM

## 2024-08-20 DIAGNOSIS — B35.9 RINGWORM: Primary | ICD-10-CM

## 2024-08-20 RX ORDER — DEXTROAMPHETAMINE SACCHARATE, AMPHETAMINE ASPARTATE, DEXTROAMPHETAMINE SULFATE AND AMPHETAMINE SULFATE 2.5; 2.5; 2.5; 2.5 MG/1; MG/1; MG/1; MG/1
10 TABLET ORAL 2 TIMES DAILY
Qty: 60 TABLET | Refills: 0 | Status: SHIPPED | OUTPATIENT
Start: 2024-08-20 | End: 2024-09-19

## 2024-08-20 NOTE — TELEPHONE ENCOUNTER
----- Message from Ninfa Senegal sent at 8/20/2024  7:07 AM CDT -----  Regarding: refill  .Type:  RX Refill Request    Who Called:  pt    Refill or New Rx: refill    RX Name and Strength:  dextroamphetamine-amphetamine 10 mg Tab 60 tablet 0 7/15/2024 8/14/2024 No  Sig - Route: Take 1 tablet (10 mg total) by mouth 2 (two) times a day.       How is the patient currently taking it? (ex. 1XDay): 2 day    Is this a 30 day or 90 day RX: 60    Preferred Pharmacy with phone number:  [facility]/pharmacy #[id number]  [place] - [address]   Phone: [phone]  Fax: [phone]        Local or Mail Order: local      Ordering Provider: Shahida    Would the patient rather a call back or a response via [facility]?  Ava    Best Call Back Number:   [phone]    Additional Information:  refill

## 2024-08-20 NOTE — TELEPHONE ENCOUNTER
----- Message from Susanne Greg sent at 8/20/2024  4:01 PM CDT -----  Regarding: medical advice  Who Called: Trey Slaughter    Caller is requesting assistance/information from provider's office.    Symptoms (please be specific): ringworm all over body     How long has patient had these symptoms:  5 days    List of preferred pharmacies on file (remove unneeded):     If different, enter pharmacy into here including location and phone number: [facility]/pharmacy #[id number]  [place] - [address]   Phone: [phone]  Fax: [phone]    Preferred Method of Contact: Phone Call    Patient's Preferred Phone Number on File: [phone]     Best Call Back Number, if different:    Additional Information: it started on leg and has spread to face, arms, legs and back.

## 2024-08-21 RX ORDER — FLUCONAZOLE 150 MG/1
150 TABLET ORAL DAILY
Qty: 1 TABLET | Refills: 0 | Status: SHIPPED | OUTPATIENT
Start: 2024-08-21 | End: 2024-08-22

## 2024-09-18 DIAGNOSIS — F90.0 ATTENTION DEFICIT HYPERACTIVITY DISORDER (ADHD), PREDOMINANTLY INATTENTIVE TYPE: ICD-10-CM

## 2024-09-18 RX ORDER — DEXTROAMPHETAMINE SACCHARATE, AMPHETAMINE ASPARTATE, DEXTROAMPHETAMINE SULFATE AND AMPHETAMINE SULFATE 2.5; 2.5; 2.5; 2.5 MG/1; MG/1; MG/1; MG/1
10 TABLET ORAL 2 TIMES DAILY
Qty: 60 TABLET | Refills: 0 | Status: SHIPPED | OUTPATIENT
Start: 2024-09-18 | End: 2024-10-18

## 2024-09-18 NOTE — TELEPHONE ENCOUNTER
----- Message from Priya Taylor sent at 9/18/2024  7:59 AM CDT -----  Regarding: refill  Who Called: Trey Slaughter    Refill or New Rx:Refill  RX Name and Strength:dextroamphetamine-amphetamine 10 mg Tab  How is the patient currently taking it? (ex. 1XDay):2x day  Is this a 30 day or 90 day RX:30  Local or Mail Order:local  List of preferred pharmacies on file (remove unneeded): [unfilled]  If different Pharmacy is requested, enter Pharmacy information here including location and phone number:  [id number] [place] [facility] [address]      Ordering Provider:      Preferred Method of Contact: Phone Call  Patient's Preferred Phone Number on File: [phone]   Best Call Back Number, if different:  Additional Information:

## 2024-09-18 NOTE — TELEPHONE ENCOUNTER
----- Message from Priya Taylor sent at 9/18/2024  7:59 AM CDT -----  Regarding: refill  Who Called: Trey Slaughter    Refill or New Rx:Refill  RX Name and Strength:dextroamphetamine-amphetamine 10 mg Tab  How is the patient currently taking it? (ex. 1XDay):2x day  Is this a 30 day or 90 day RX:30  Local or Mail Order:local  List of preferred pharmacies on file (remove unneeded): [unfilled]  If different Pharmacy is requested, enter Pharmacy information here including location and phone number:  [id number] [place] [facility] [address]      Ordering Provider:      Preferred Method of Contact: Phone Call  Patient's Preferred Phone Number on File: [phone]   Best Call Back Number, if different:  Additional Information:

## 2024-09-23 ENCOUNTER — TELEPHONE (OUTPATIENT)
Dept: INTERNAL MEDICINE | Facility: CLINIC | Age: 24
End: 2024-09-23
Payer: COMMERCIAL

## 2024-10-02 ENCOUNTER — OFFICE VISIT (OUTPATIENT)
Dept: INTERNAL MEDICINE | Facility: CLINIC | Age: 24
End: 2024-10-02
Payer: COMMERCIAL

## 2024-10-02 ENCOUNTER — TELEPHONE (OUTPATIENT)
Dept: INTERNAL MEDICINE | Facility: CLINIC | Age: 24
End: 2024-10-02

## 2024-10-02 VITALS
DIASTOLIC BLOOD PRESSURE: 78 MMHG | HEIGHT: 71 IN | SYSTOLIC BLOOD PRESSURE: 116 MMHG | WEIGHT: 178 LBS | BODY MASS INDEX: 24.92 KG/M2

## 2024-10-02 DIAGNOSIS — F41.1 ANXIETY STATE: Chronic | ICD-10-CM

## 2024-10-02 DIAGNOSIS — F90.0 ATTENTION DEFICIT HYPERACTIVITY DISORDER (ADHD), PREDOMINANTLY INATTENTIVE TYPE: Primary | ICD-10-CM

## 2024-10-02 DIAGNOSIS — F12.90 CANNABIS USE DISORDER: ICD-10-CM

## 2024-10-02 PROBLEM — R51.9 HEADACHE ABOVE THE EYE REGION: Status: RESOLVED | Noted: 2024-04-11 | Resolved: 2024-10-02

## 2024-10-02 PROBLEM — R56.9 SEIZURE-LIKE ACTIVITY: Status: RESOLVED | Noted: 2024-03-23 | Resolved: 2024-10-02

## 2024-10-02 PROCEDURE — 3074F SYST BP LT 130 MM HG: CPT | Mod: CPTII,95,,

## 2024-10-02 PROCEDURE — 3008F BODY MASS INDEX DOCD: CPT | Mod: CPTII,95,,

## 2024-10-02 PROCEDURE — 99214 OFFICE O/P EST MOD 30 MIN: CPT | Mod: 95,,,

## 2024-10-02 PROCEDURE — 1159F MED LIST DOCD IN RCRD: CPT | Mod: CPTII,95,,

## 2024-10-02 PROCEDURE — 3078F DIAST BP <80 MM HG: CPT | Mod: CPTII,95,,

## 2024-10-02 PROCEDURE — 3044F HG A1C LEVEL LT 7.0%: CPT | Mod: CPTII,95,,

## 2024-10-02 RX ORDER — DEXTROAMPHETAMINE SACCHARATE, AMPHETAMINE ASPARTATE, DEXTROAMPHETAMINE SULFATE AND AMPHETAMINE SULFATE 2.5; 2.5; 2.5; 2.5 MG/1; MG/1; MG/1; MG/1
10 TABLET ORAL 2 TIMES DAILY
Qty: 60 TABLET | Refills: 0 | Status: SHIPPED | OUTPATIENT
Start: 2024-11-02 | End: 2024-12-02

## 2024-10-02 RX ORDER — DEXTROAMPHETAMINE SACCHARATE, AMPHETAMINE ASPARTATE, DEXTROAMPHETAMINE SULFATE AND AMPHETAMINE SULFATE 2.5; 2.5; 2.5; 2.5 MG/1; MG/1; MG/1; MG/1
10 TABLET ORAL 2 TIMES DAILY
Qty: 60 TABLET | Refills: 0 | Status: SHIPPED | OUTPATIENT
Start: 2024-12-02 | End: 2025-01-01

## 2024-10-02 RX ORDER — DEXTROAMPHETAMINE SACCHARATE, AMPHETAMINE ASPARTATE, DEXTROAMPHETAMINE SULFATE AND AMPHETAMINE SULFATE 2.5; 2.5; 2.5; 2.5 MG/1; MG/1; MG/1; MG/1
10 TABLET ORAL 2 TIMES DAILY
Qty: 60 TABLET | Refills: 0 | Status: SHIPPED | OUTPATIENT
Start: 2024-10-02 | End: 2024-11-01

## 2024-10-02 NOTE — PROGRESS NOTES
Patient ID: Trey Slaughter is a 24 y.o. male.    Chief Complaint: Follow-up (3m)  The patient location is: work  Visit type: audiovisual    Face to Face time with patient: 25 minutes  35 minutes of total time spent on the encounter, which includes face to face time and non-face to face time preparing to see the patient (eg, review of tests), Obtaining and/or reviewing separately obtained history, Documenting clinical information in the electronic or other health record, Independently interpreting results (not separately reported) and communicating results to the patient/family/caregiver, or Care coordination (not separately reported).     Each patient to whom he or she provides medical services by telemedicine is:  (1) informed of the relationship between the physician and patient and the respective role of any other health care provider with respect to management of the patient; and (2) notified that he or she may decline to receive medical services by telemedicine and may withdraw from such care at any time.    24-year-old male here today for a three-month follow-up visit.  Medical comorbidities include anxiety, ADHD. Previously on extended-release Adderall formulary, however switched to immediate release formulary due to afternoon jitter/trouble sleeping.   Since last visit patient reports he has been fairly well without major illness.  Patient is happy to report cessation of cannabis use for the past six-months with no plans of resumption.  Anxiety well-controlled currently on propranolol.  Is still currently enrolled in grad school and reports managing anxiety/stress well.  Patient currently on Adderall 10 mg b.i.d. for ADHD.  Tolerating well.  No headaches, palpitations, or unintended weight loss.  Flu vaccine and HPV vaccination discussed with patient, however wishing to hold off on both for now.  Otherwise patient feeling fairly well today without acute complaints.      Wellness: 7/2/24        MEDICAL  "HISTORY:    Past Medical History:   Diagnosis Date    Anxiety state     Attention-deficit hyperactivity disorder, unspecified type     Hyperbilirubinemia     Low back pain     Pityriasis versicolor       Past Surgical History:   Procedure Laterality Date    JOINT REPLACEMENT  December of 2018    AC Joint    Shoulder joint operation N/A     TONSILLECTOMY      I dont remember when      Social History     Tobacco Use    Smoking status: Never    Smokeless tobacco: Never   Substance Use Topics    Alcohol use: Never    Drug use: Not Currently          Health Maintenance Due   Topic Date Due    Hepatitis C Screening  Never done    HPV Vaccines (2 - Male 3-dose series) 06/12/2019    Influenza Vaccine (1) Never done    COVID-19 Vaccine (3 - 2024-25 season) 09/01/2024          Patient Care Team:  Shahida Blum MD as PCP - General (Internal Medicine)      Review of Systems   Constitutional:  Negative for activity change, fatigue, fever and unexpected weight change.   HENT:  Negative for congestion, hearing loss, rhinorrhea, sore throat and trouble swallowing.    Eyes:  Negative for discharge, redness and visual disturbance.   Respiratory:  Negative for cough, chest tightness, shortness of breath and wheezing.    Cardiovascular:  Negative for chest pain and palpitations.   Gastrointestinal:  Negative for abdominal pain, blood in stool, constipation, diarrhea, nausea and vomiting.   Endocrine: Negative for polydipsia and polyuria.   Genitourinary:  Negative for difficulty urinating, dysuria, flank pain, frequency, hematuria and urgency.   Musculoskeletal:  Negative for arthralgias, gait problem, joint swelling, myalgias and neck pain.   Skin:  Negative for rash and wound.   Neurological:  Negative for facial asymmetry, speech difficulty, weakness and headaches.   Psychiatric/Behavioral:  Negative for confusion and dysphoric mood.    All other systems reviewed and are negative.      Objective:   /78   Ht 5' 11" " (1.803 m)   Wt 80.7 kg (178 lb)   BMI 24.83 kg/m²      Physical Exam    No physical exam due to telemedicine format    Assessment:       ICD-10-CM ICD-9-CM   1. Attention deficit hyperactivity disorder (ADHD), predominantly inattentive type  F90.0 314.00   2. Anxiety state  F41.1 300.00   3. Cannabis use disorder  F12.90 305.20          Plan:     Problem List Items Addressed This Visit          Psychiatric    Attention deficit hyperactivity disorder (ADHD) - Primary (Chronic)     -stable   -currently on Adderall 10 mg b.i.d., continue  -avoid excessive caffeine or energy drink use while on ADHD medication   -notify clinic for new or worsening symptoms   -follow-up q.3 months for refills           Relevant Medications    dextroamphetamine-amphetamine 10 mg Tab (Start on 12/2/2024)    dextroamphetamine-amphetamine (ADDERALL) 10 mg Tab (Start on 11/2/2024)    dextroamphetamine-amphetamine (ADDERALL) 10 mg Tab    Anxiety state (Chronic)     -stable   -currently on propranolol 80 mg daily, continue  -avoid excessive caffeine or energy drink use         Cannabis use disorder     -reports cessation for the past 6 labs                 Follow up in about 3 months (around 1/2/2025) for ADD.   -plan specifics discussed above    Orders Placed This Encounter    dextroamphetamine-amphetamine 10 mg Tab    dextroamphetamine-amphetamine (ADDERALL) 10 mg Tab    dextroamphetamine-amphetamine (ADDERALL) 10 mg Tab          Medication List with Changes/Refills   New Medications    DEXTROAMPHETAMINE-AMPHETAMINE (ADDERALL) 10 MG TAB    Take 1 tablet (10 mg total) by mouth 2 (two) times a day.    DEXTROAMPHETAMINE-AMPHETAMINE (ADDERALL) 10 MG TAB    Take 1 tablet (10 mg total) by mouth 2 (two) times a day.   Current Medications    PROPRANOLOL (INDERAL [place]) 80 MG 24 HR CAPSULE    TAKE 1 CAPSULE BY MOUTH ONCE DAILY.   Changed and/or Refilled Medications    Modified Medication Previous Medication    DEXTROAMPHETAMINE-AMPHETAMINE 10 MG TAB  dextroamphetamine-amphetamine 10 mg Tab       Take 1 tablet (10 mg total) by mouth 2 (two) times a day.    Take 1 tablet (10 mg total) by mouth 2 (two) times a day.

## 2024-10-02 NOTE — ASSESSMENT & PLAN NOTE
-stable   -currently on propranolol 80 mg daily, continue  -avoid excessive caffeine or energy drink use

## 2024-10-02 NOTE — ASSESSMENT & PLAN NOTE
-stable   -currently on Adderall 10 mg b.i.d., continue  -avoid excessive caffeine or energy drink use while on ADHD medication   -notify clinic for new or worsening symptoms   -follow-up q.3 months for refills

## 2024-10-02 NOTE — TELEPHONE ENCOUNTER
----- Message from JESSICA Obrien sent at 10/2/2024 12:07 PM CDT -----  Please contact patient and schedule for a three-month follow-up for ADHD

## 2024-10-07 PROBLEM — Z00.00 WELLNESS EXAMINATION: Status: RESOLVED | Noted: 2022-08-16 | Resolved: 2024-10-07

## 2024-11-06 ENCOUNTER — TELEPHONE (OUTPATIENT)
Dept: INTERNAL MEDICINE | Facility: CLINIC | Age: 24
End: 2024-11-06
Payer: COMMERCIAL

## 2024-11-06 NOTE — TELEPHONE ENCOUNTER
----- Message from [employer] sent at 11/6/2024 12:30 PM CST -----  Regarding: med advice  Who Called: Trey Slaughter    Caller is requesting assistance/information from provider's office.    Symptoms (please be specific):    How long has patient had these symptoms:    List of preferred pharmacies on file (remove unneeded): [unfilled]  If different, enter pharmacy into here including location and phone number:       Preferred Method of Contact: Phone Call  Patient's Preferred Phone Number on File: [phone]   Best Call Back Number, if different:  Additional Information: pt is requesting a call back regarding orders for an MRI

## 2024-11-06 NOTE — TELEPHONE ENCOUNTER
Spoke with pt regarding order for MRI of back, informed pt he will need an OV for the MRI to be completed. Assisted pt with scheduling appt

## 2024-11-07 ENCOUNTER — HOSPITAL ENCOUNTER (OUTPATIENT)
Dept: RADIOLOGY | Facility: HOSPITAL | Age: 24
Discharge: HOME | End: 2024-11-07
Payer: COMMERCIAL

## 2024-11-07 ENCOUNTER — OFFICE VISIT (OUTPATIENT)
Dept: INTERNAL MEDICINE | Facility: CLINIC | Age: 24
End: 2024-11-07
Payer: COMMERCIAL

## 2024-11-07 VITALS
SYSTOLIC BLOOD PRESSURE: 122 MMHG | HEIGHT: 71 IN | DIASTOLIC BLOOD PRESSURE: 76 MMHG | HEART RATE: 70 BPM | OXYGEN SATURATION: 98 % | WEIGHT: 178 LBS | BODY MASS INDEX: 24.92 KG/M2

## 2024-11-07 DIAGNOSIS — M54.50 ACUTE MIDLINE LOW BACK PAIN WITHOUT SCIATICA: Primary | ICD-10-CM

## 2024-11-07 DIAGNOSIS — M54.50 ACUTE MIDLINE LOW BACK PAIN WITHOUT SCIATICA: ICD-10-CM

## 2024-11-07 PROCEDURE — 3074F SYST BP LT 130 MM HG: CPT | Mod: CPTII,,,

## 2024-11-07 PROCEDURE — 72110 X-RAY EXAM L-2 SPINE 4/>VWS: CPT | Mod: TC

## 2024-11-07 PROCEDURE — 3044F HG A1C LEVEL LT 7.0%: CPT | Mod: CPTII,,,

## 2024-11-07 PROCEDURE — 3008F BODY MASS INDEX DOCD: CPT | Mod: CPTII,,,

## 2024-11-07 PROCEDURE — 99214 OFFICE O/P EST MOD 30 MIN: CPT | Mod: ,,,

## 2024-11-07 PROCEDURE — 72202 X-RAY EXAM SI JOINTS 3/> VWS: CPT | Mod: TC

## 2024-11-07 PROCEDURE — 1160F RVW MEDS BY RX/DR IN RCRD: CPT | Mod: CPTII,,,

## 2024-11-07 PROCEDURE — 3078F DIAST BP <80 MM HG: CPT | Mod: CPTII,,,

## 2024-11-07 PROCEDURE — 1159F MED LIST DOCD IN RCRD: CPT | Mod: CPTII,,,

## 2024-11-07 RX ORDER — CYCLOBENZAPRINE HCL 5 MG
5 TABLET ORAL NIGHTLY PRN
Qty: 5 TABLET | Refills: 0 | Status: SHIPPED | OUTPATIENT
Start: 2024-11-07 | End: 2024-11-12

## 2024-11-07 RX ORDER — CELECOXIB 100 MG/1
100 CAPSULE ORAL 2 TIMES DAILY PRN
Qty: 14 CAPSULE | Refills: 0 | Status: SHIPPED | OUTPATIENT
Start: 2024-11-07 | End: 2024-11-14

## 2024-11-07 NOTE — ASSESSMENT & PLAN NOTE
-acute   -currently attempting physical therapy, okay to continue for now   -obtain x-ray lumbar and sacroiliac   -initiate Celebrex b.i.d. p.r.n. trial   -initiate Flexeril q.h.s. p.r.n. trial  -encourage heat application   -gentle range-of-motion encouraged

## 2024-11-07 NOTE — PROGRESS NOTES
Patient ID: Trey Slaughter is a 24 y.o. male.    Chief Complaint: Back Pain (Back pain to middle of his lower back. Tingling and numbness to feet recently. Denies any trauma. Been going on for years. )    24-year-old male here today for a requested visit.  Medical comorbidities include anxiety, ADHD. Previously on extended-release Adderall formulary, however switched to immediate release formulary due to afternoon jitter/trouble sleeping.   Today presents with concerns over new onset midline lower back pain.  Onset of symptoms within last 2 weeks.  Denies known traumas or over exertions.  Is currently attempting to treat with OTC NSAIDs and physical therapy with mild improvement only.  No limb weakness, paresthesia, or change in bowels/bladder.  Does voice some concerns over possible bulging disc.  Otherwise stable    Wellness: 7/2/24           MEDICAL HISTORY:    Past Medical History:   Diagnosis Date    Anxiety state     Attention-deficit hyperactivity disorder, unspecified type     Hyperbilirubinemia     Low back pain     Pityriasis versicolor       Past Surgical History:   Procedure Laterality Date    JOINT REPLACEMENT  December of 2018    AC Joint    Shoulder joint operation N/A     TONSILLECTOMY      I dont remember when      Social History     Tobacco Use    Smoking status: Never    Smokeless tobacco: Never   Substance Use Topics    Alcohol use: Never    Drug use: Not Currently          Health Maintenance Due   Topic Date Due    Hepatitis C Screening  Never done    HPV Vaccines (2 - Male 3-dose series) 06/12/2019    COVID-19 Vaccine (3 - 2024-25 season) 09/01/2024          Patient Care Team:  Shahida Blum MD as PCP - General (Internal Medicine)      Review of Systems   Constitutional:  Negative for fatigue and fever.   HENT:  Negative for congestion, rhinorrhea, sore throat and trouble swallowing.    Eyes:  Negative for redness and visual disturbance.   Respiratory:  Negative for cough,  "chest tightness and shortness of breath.    Cardiovascular:  Negative for chest pain and palpitations.   Gastrointestinal:  Negative for abdominal pain, constipation, diarrhea, nausea and vomiting.   Genitourinary:  Negative for dysuria, flank pain, frequency and urgency.   Musculoskeletal:  Positive for back pain. Negative for arthralgias, gait problem and myalgias.   Skin:  Negative for rash and wound.   Neurological:  Negative for facial asymmetry, speech difficulty, weakness and headaches.   All other systems reviewed and are negative.      Objective:   /76 (BP Location: Right arm, Patient Position: Sitting)   Pulse 70   Ht 5' 11" (1.803 m)   Wt 80.7 kg (178 lb)   SpO2 98%   BMI 24.83 kg/m²      Physical Exam  Constitutional:       General: He is not in acute distress.     Appearance: Normal appearance.   HENT:      Right Ear: Tympanic membrane, ear canal and external ear normal.      Left Ear: Tympanic membrane, ear canal and external ear normal.      Nose: Nose normal.      Mouth/Throat:      Mouth: Mucous membranes are moist.      Pharynx: Oropharynx is clear.   Eyes:      Extraocular Movements: Extraocular movements intact.      Conjunctiva/sclera: Conjunctivae normal.      Pupils: Pupils are equal, round, and reactive to light.   Cardiovascular:      Rate and Rhythm: Normal rate and regular rhythm.      Pulses: Normal pulses.      Heart sounds: Normal heart sounds. No murmur heard.     No gallop.   Pulmonary:      Effort: Pulmonary effort is normal.      Breath sounds: Normal breath sounds. No wheezing.   Abdominal:      General: Bowel sounds are normal. There is no distension.      Palpations: Abdomen is soft. There is no mass.      Tenderness: There is no abdominal tenderness. There is no guarding.   Musculoskeletal:         General: Normal range of motion.      Lumbar back: Tenderness and bony tenderness present. No swelling or signs of trauma. Normal range of motion.        Back:       " Comments: Area of discomfort   Skin:     General: Skin is warm and dry.   Neurological:      Mental Status: He is alert. Mental status is at baseline.      Sensory: No sensory deficit.      Motor: No weakness.           Assessment:       ICD-10-CM ICD-9-CM   1. Acute midline low back pain without sciatica  M54.50 724.2        Plan:     Problem List Items Addressed This Visit          Orthopedic    Acute midline low back pain without sciatica - Primary     -acute   -currently attempting physical therapy, okay to continue for now   -obtain x-ray lumbar and sacroiliac   -initiate Celebrex b.i.d. p.r.n. trial   -initiate Flexeril q.h.s. p.r.n. trial  -encourage heat application   -gentle range-of-motion encouraged         Relevant Medications    celecoxib (CELEBREX) 100 MG capsule    cyclobenzaprine (FLEXERIL) 5 MG tablet    Other Relevant Orders    X-Ray Lumbar Spine 5 View    X-Ray Sacroiliac Joints Complete          No follow-ups on file.   -plan specifics discussed above    Orders Placed This Encounter    X-Ray Lumbar Spine 5 View    X-Ray Sacroiliac Joints Complete    celecoxib (CELEBREX) 100 MG capsule    cyclobenzaprine (FLEXERIL) 5 MG tablet        Medication List with Changes/Refills   New Medications    CELECOXIB (CELEBREX) 100 MG CAPSULE    Take 1 capsule (100 mg total) by mouth 2 (two) times daily as needed for Pain.    CYCLOBENZAPRINE (FLEXERIL) 5 MG TABLET    Take 1 tablet (5 mg total) by mouth nightly as needed for Muscle spasms.   Current Medications    DEXTROAMPHETAMINE-AMPHETAMINE (ADDERALL) 10 MG TAB    Take 1 tablet (10 mg total) by mouth 2 (two) times a day.    DEXTROAMPHETAMINE-AMPHETAMINE 10 MG TAB    Take 1 tablet (10 mg total) by mouth 2 (two) times a day.    PROPRANOLOL (INDERAL [place]) 80 MG 24 HR CAPSULE    TAKE 1 CAPSULE BY MOUTH ONCE DAILY.

## 2024-11-08 ENCOUNTER — PATIENT MESSAGE (OUTPATIENT)
Dept: INTERNAL MEDICINE | Facility: CLINIC | Age: 24
End: 2024-11-08
Payer: COMMERCIAL

## 2024-11-18 ENCOUNTER — TELEPHONE (OUTPATIENT)
Dept: INTERNAL MEDICINE | Facility: CLINIC | Age: 24
End: 2024-11-18
Payer: COMMERCIAL

## 2024-11-18 DIAGNOSIS — M54.9 DORSALGIA, UNSPECIFIED: Primary | ICD-10-CM

## 2024-11-18 NOTE — TELEPHONE ENCOUNTER
----- Message from [employer] sent at 11/18/2024 10:43 AM CST -----  .Who Called: Trey Arrieta Slaughter    Caller is requesting information on test results.    Name of Test (Lab/Mammo/Etc): X-Ray  Date of Test: 11/7/24  Where the test was performed: [facility]  Ordering Provider: Les Peterson    Preferred Method of Contact: Phone Call  Patient's Preferred Phone Number on File: [phone]   Best Call Back Number, if different:  Additional Information: Pt is requesting to speak with the provider regarding his results. Please call to advise

## 2024-11-18 NOTE — TELEPHONE ENCOUNTER
----- Message from [employer] sent at 11/18/2024 10:43 AM CST -----  .Who Called: Trey Arrieta Slaughter    Caller is requesting information on test results.    Name of Test (Lab/Mammo/Etc): X-Ray  Date of Test: 11/7/24  Where the test was performed: [facility]  Ordering Provider: Les Peterson    Preferred Method of Contact: Phone Call  Patient's Preferred Phone Number on File: [phone]   Best Call Back Number, if different:  Additional Information: Pt is requesting to speak with the provider regarding his results. Please call to advise

## 2024-12-17 ENCOUNTER — HOSPITAL ENCOUNTER (OUTPATIENT)
Dept: RADIOLOGY | Facility: HOSPITAL | Age: 24
Discharge: HOME | End: 2024-12-17
Attending: INTERNAL MEDICINE
Payer: COMMERCIAL

## 2024-12-17 DIAGNOSIS — M54.9 DORSALGIA, UNSPECIFIED: ICD-10-CM

## 2024-12-17 PROCEDURE — 72148 MRI LUMBAR SPINE W/O DYE: CPT | Mod: TC

## 2024-12-18 ENCOUNTER — TELEPHONE (OUTPATIENT)
Dept: INTERNAL MEDICINE | Facility: CLINIC | Age: 24
End: 2024-12-18
Payer: COMMERCIAL

## 2024-12-19 NOTE — TELEPHONE ENCOUNTER
----- Message from JESSICA Obrien sent at 12/19/2024  7:18 AM CST -----  Please inform patient he does have some chronic stable findings on his MRI.  These are likely the cause of his intermittent discomfort.  If wishing, okay to have referral to spine specialist for formal discussion as well as potential treatment method  s.

## 2024-12-19 NOTE — PROGRESS NOTES
Please inform patient he does have some chronic stable findings on his MRI.  These are likely the cause of his intermittent discomfort.  If wishing, okay to have referral to spine specialist for formal discussion as well as potential treatment methods.

## 2024-12-30 ENCOUNTER — TELEPHONE (OUTPATIENT)
Dept: INTERNAL MEDICINE | Facility: CLINIC | Age: 24
End: 2024-12-30
Payer: COMMERCIAL

## 2025-01-07 ENCOUNTER — TELEPHONE (OUTPATIENT)
Dept: INTERNAL MEDICINE | Facility: CLINIC | Age: 25
End: 2025-01-07

## 2025-01-07 ENCOUNTER — OFFICE VISIT (OUTPATIENT)
Dept: INTERNAL MEDICINE | Facility: CLINIC | Age: 25
End: 2025-01-07
Payer: COMMERCIAL

## 2025-01-07 VITALS — HEIGHT: 71 IN | WEIGHT: 170 LBS | BODY MASS INDEX: 23.8 KG/M2

## 2025-01-07 DIAGNOSIS — F41.1 ANXIETY STATE: Chronic | ICD-10-CM

## 2025-01-07 DIAGNOSIS — F90.0 ATTENTION DEFICIT HYPERACTIVITY DISORDER (ADHD), PREDOMINANTLY INATTENTIVE TYPE: Primary | ICD-10-CM

## 2025-01-07 RX ORDER — DEXTROAMPHETAMINE SACCHARATE, AMPHETAMINE ASPARTATE, DEXTROAMPHETAMINE SULFATE AND AMPHETAMINE SULFATE 2.5; 2.5; 2.5; 2.5 MG/1; MG/1; MG/1; MG/1
10 TABLET ORAL 2 TIMES DAILY
Qty: 60 TABLET | Refills: 0 | Status: SHIPPED | OUTPATIENT
Start: 2025-03-07 | End: 2025-04-06

## 2025-01-07 RX ORDER — DEXTROAMPHETAMINE SACCHARATE, AMPHETAMINE ASPARTATE, DEXTROAMPHETAMINE SULFATE AND AMPHETAMINE SULFATE 2.5; 2.5; 2.5; 2.5 MG/1; MG/1; MG/1; MG/1
10 TABLET ORAL 2 TIMES DAILY
Qty: 60 TABLET | Refills: 0 | Status: SHIPPED | OUTPATIENT
Start: 2025-01-07 | End: 2025-02-06

## 2025-01-07 RX ORDER — DEXTROAMPHETAMINE SACCHARATE, AMPHETAMINE ASPARTATE, DEXTROAMPHETAMINE SULFATE AND AMPHETAMINE SULFATE 2.5; 2.5; 2.5; 2.5 MG/1; MG/1; MG/1; MG/1
10 TABLET ORAL 2 TIMES DAILY
Qty: 60 TABLET | Refills: 0 | Status: SHIPPED | OUTPATIENT
Start: 2025-02-07 | End: 2025-03-09

## 2025-01-07 NOTE — Clinical Note
Please contact patient and schedule for a three-month follow-up for ADHD medication.  Okay to be virtual if he wants

## 2025-01-07 NOTE — PROGRESS NOTES
Patient ID: Trey Slaughter is a 24 y.o. male.    Chief Complaint: Follow-up (3 month Follow up ADD medication)      The patient location is:  Work  Visit type: audiovisual    Face to Face time with patient:  25 minutes  35 minutes of total time spent on the encounter, which includes face to face time and non-face to face time preparing to see the patient (eg, review of tests), Obtaining and/or reviewing separately obtained history, Documenting clinical information in the electronic or other health record, Independently interpreting results (not separately reported) and communicating results to the patient/family/caregiver, or Care coordination (not separately reported).     Each patient to whom he or she provides medical services by telemedicine is:  (1) informed of the relationship between the physician and patient and the respective role of any other health care provider with respect to management of the patient; and (2) notified that he or she may decline to receive medical services by telemedicine and may withdraw from such care at any time.      24-year-old male here today for a three-month medication follow up visit.  Medical comorbidities include anxiety , ADHD.  Also significant for intermittent lower back pain with recent MRI indicating multilevel spondylosis.   Since last visit patient reports he has been fairly well without acute injury or illness.  Still continues with some lower back discomfort, however has improved now treating with physical therapy and stretches.  Per patient, would prefer to treat conservatively for now.  Patient does have history ADHD currently on Adderall 10 mg b.i.d. and tolerating well.  No chest pain, palpitations, shortness a breath, headaches, or unintended weight loss.  Does occasionally have anxiety and utilizes propranolol.  Otherwise patient feeling fairly well without acute concerns     Wellness: 7/2/24          MEDICAL HISTORY:    Past Medical History:  "  Diagnosis Date    Anxiety state     Attention-deficit hyperactivity disorder, unspecified type     Hyperbilirubinemia     Low back pain     Pityriasis versicolor       Past Surgical History:   Procedure Laterality Date    JOINT REPLACEMENT  December of 2018    AC Joint    Shoulder joint operation N/A     TONSILLECTOMY      I dont remember when      Social History     Tobacco Use    Smoking status: Never    Smokeless tobacco: Never   Substance Use Topics    Alcohol use: Never    Drug use: Not Currently          Health Maintenance Due   Topic Date Due    Hepatitis C Screening  Never done    HPV Vaccines (2 - Male 3-dose series) 06/12/2019    COVID-19 Vaccine (3 - 2024-25 season) 09/01/2024          Patient Care Team:  Shahida Blum MD as PCP - General (Internal Medicine)      Review of Systems   Constitutional:  Negative for activity change, fatigue, fever and unexpected weight change.   HENT:  Negative for congestion, hearing loss, rhinorrhea, sore throat and trouble swallowing.    Eyes:  Negative for discharge, redness and visual disturbance.   Respiratory:  Negative for cough, chest tightness, shortness of breath and wheezing.    Cardiovascular:  Negative for chest pain and palpitations.   Gastrointestinal:  Negative for abdominal pain, blood in stool, constipation, diarrhea, nausea and vomiting.   Endocrine: Negative for polydipsia and polyuria.   Genitourinary:  Negative for difficulty urinating, dysuria, flank pain, frequency, hematuria and urgency.   Musculoskeletal:  Negative for arthralgias, gait problem, joint swelling, myalgias and neck pain.   Skin:  Negative for rash and wound.   Neurological:  Negative for facial asymmetry, speech difficulty, weakness and headaches.   Psychiatric/Behavioral:  Negative for confusion and dysphoric mood.    All other systems reviewed and are negative.      Objective:   Ht 5' 11" (1.803 m)   Wt 77.1 kg (170 lb)   BMI 23.71 kg/m²      Physical Exam      **No " physical exam completed due to telemedicine format    Assessment:       ICD-10-CM ICD-9-CM   1. Attention deficit hyperactivity disorder (ADHD), predominantly inattentive type  F90.0 314.00   2. Anxiety state  F41.1 300.00        Plan:     Problem List Items Addressed This Visit          Psychiatric    Attention deficit hyperactivity disorder (ADHD) - Primary (Chronic)     -stable   -currently on Adderall 10 mg b.i.d., continue  -avoid excessive caffeine or energy drink use while on ADHD medication   -notify clinic for new or worsening symptoms   -follow-up q.3 months for refills            Relevant Medications    dextroamphetamine-amphetamine 10 mg Tab (Start on 3/7/2025)    dextroamphetamine-amphetamine (ADDERALL) 10 mg Tab (Start on 2/7/2025)    dextroamphetamine-amphetamine (ADDERALL) 10 mg Tab    Anxiety state (Chronic)     -stable   -currently on propranolol 80 mg daily, continue  -avoid excessive caffeine or energy drink use               Follow up in about 3 months (around 4/7/2025) for ADD, Virtual Visit.   -plan specifics discussed above    Orders Placed This Encounter    dextroamphetamine-amphetamine 10 mg Tab    dextroamphetamine-amphetamine (ADDERALL) 10 mg Tab    dextroamphetamine-amphetamine (ADDERALL) 10 mg Tab        Medication List with Changes/Refills   New Medications    DEXTROAMPHETAMINE-AMPHETAMINE (ADDERALL) 10 MG TAB    Take 1 tablet (10 mg total) by mouth 2 (two) times a day.    DEXTROAMPHETAMINE-AMPHETAMINE (ADDERALL) 10 MG TAB    Take 1 tablet (10 mg total) by mouth 2 (two) times a day.   Current Medications    PROPRANOLOL (INDERAL LA) 80 MG 24 HR CAPSULE    TAKE 1 CAPSULE BY MOUTH ONCE DAILY.   Changed and/or Refilled Medications    Modified Medication Previous Medication    DEXTROAMPHETAMINE-AMPHETAMINE 10 MG TAB dextroamphetamine-amphetamine 10 mg Tab       Take 1 tablet (10 mg total) by mouth 2 (two) times a day.    Take 1 tablet (10 mg total) by mouth 2 (two) times a day.

## 2025-01-07 NOTE — TELEPHONE ENCOUNTER
----- Message from JESSICA Obrien sent at 1/7/2025  3:13 PM CST -----  Please contact patient and schedule for a three-month follow-up for ADHD medication.  Okay to be virtual if he wants

## 2025-01-10 DIAGNOSIS — F41.1 ANXIETY STATE: ICD-10-CM

## 2025-01-10 RX ORDER — PROPRANOLOL HYDROCHLORIDE 80 MG/1
80 CAPSULE, EXTENDED RELEASE ORAL
Qty: 90 CAPSULE | Refills: 1 | Status: SHIPPED | OUTPATIENT
Start: 2025-01-10

## 2025-02-10 DIAGNOSIS — F90.0 ATTENTION DEFICIT HYPERACTIVITY DISORDER (ADHD), PREDOMINANTLY INATTENTIVE TYPE: ICD-10-CM

## 2025-02-10 RX ORDER — DEXTROAMPHETAMINE SACCHARATE, AMPHETAMINE ASPARTATE, DEXTROAMPHETAMINE SULFATE AND AMPHETAMINE SULFATE 2.5; 2.5; 2.5; 2.5 MG/1; MG/1; MG/1; MG/1
10 TABLET ORAL 2 TIMES DAILY
Qty: 60 TABLET | Refills: 0 | Status: SHIPPED | OUTPATIENT
Start: 2025-02-10 | End: 2025-03-12

## 2025-02-10 NOTE — TELEPHONE ENCOUNTER
----- Message from Andreas sent at 2/10/2025  8:42 AM CST -----  Who Called: Trey Slaughter    Refill or New Rx:Refill  RX Name and Strength: dextroamphetamine-amphetamine (ADDERALL) 10 mg Tab  How is the patient currently taking it? (ex. 1XDay):     Is this a 30 day or 90 day RX:   Local or Mail Order:   List of preferred pharmacies on file (remove unneeded):  [id number]      If different Pharmacy is requested, enter Pharmacy information here including location and phone number:     Ordering Provider:       Preferred Method of Contact: Phone Call  Patient's Preferred Phone Number on File: [phone]   Best Call Back Number, if different:  Additional Information:  refill

## 2025-03-12 ENCOUNTER — TELEPHONE (OUTPATIENT)
Dept: INTERNAL MEDICINE | Facility: CLINIC | Age: 25
End: 2025-03-12
Payer: COMMERCIAL

## 2025-03-12 NOTE — TELEPHONE ENCOUNTER
----- Message from Radha sent at 3/12/2025  9:04 AM CDT -----  Who Called: Trey Berny ForemanRefill or New Rx:RefillRX Name and Strength:dextroamphetamine-amphetamine (ADDERALL) 10 mg TabHow is the patient currently taking it? (ex. 1XDay):n/aIs this a 30 day or 90 day RX:n/aLocal or Mail Order: LOCALList of preferred pharmacies on file (remove unneeded): [facility]/pharm[id number] [place] [facility] [address] [address] Phone: [phone]: [phone] Provider: Nina Method of Contact: Phone CallPatient's Preferred Phone Number on File: [phone] Best Call Back Number, if different: N/AAdditional Information: N/A

## 2025-04-15 ENCOUNTER — TELEPHONE (OUTPATIENT)
Dept: INTERNAL MEDICINE | Facility: CLINIC | Age: 25
End: 2025-04-15
Payer: COMMERCIAL

## 2025-04-15 NOTE — TELEPHONE ENCOUNTER
----- Message from Nurse Bledsoe sent at 4/3/2025  7:47 AM CDT -----  Regarding: PV for 4/22/25  No labs for 4/22/25

## 2025-04-22 ENCOUNTER — OFFICE VISIT (OUTPATIENT)
Dept: INTERNAL MEDICINE | Facility: CLINIC | Age: 25
End: 2025-04-22
Payer: COMMERCIAL

## 2025-04-22 ENCOUNTER — TELEPHONE (OUTPATIENT)
Dept: INTERNAL MEDICINE | Facility: CLINIC | Age: 25
End: 2025-04-22

## 2025-04-22 VITALS — HEIGHT: 71 IN | BODY MASS INDEX: 23.94 KG/M2 | WEIGHT: 171 LBS

## 2025-04-22 DIAGNOSIS — F90.0 ATTENTION DEFICIT HYPERACTIVITY DISORDER (ADHD), PREDOMINANTLY INATTENTIVE TYPE: Primary | ICD-10-CM

## 2025-04-22 DIAGNOSIS — M54.50 ACUTE MIDLINE LOW BACK PAIN WITHOUT SCIATICA: ICD-10-CM

## 2025-04-22 DIAGNOSIS — T14.90XA SPORTS INJURY: ICD-10-CM

## 2025-04-22 DIAGNOSIS — F41.1 ANXIETY STATE: Chronic | ICD-10-CM

## 2025-04-22 PROCEDURE — 98006 SYNCH AUDIO-VIDEO EST MOD 30: CPT | Mod: 95,,,

## 2025-04-22 PROCEDURE — 1159F MED LIST DOCD IN RCRD: CPT | Mod: CPTII,95,,

## 2025-04-22 PROCEDURE — 3008F BODY MASS INDEX DOCD: CPT | Mod: CPTII,95,,

## 2025-04-22 RX ORDER — DEXTROAMPHETAMINE SACCHARATE, AMPHETAMINE ASPARTATE, DEXTROAMPHETAMINE SULFATE AND AMPHETAMINE SULFATE 2.5; 2.5; 2.5; 2.5 MG/1; MG/1; MG/1; MG/1
10 TABLET ORAL 2 TIMES DAILY
Qty: 60 TABLET | Refills: 0 | Status: SHIPPED | OUTPATIENT
Start: 2025-06-23 | End: 2025-07-23

## 2025-04-22 RX ORDER — DEXTROAMPHETAMINE SACCHARATE, AMPHETAMINE ASPARTATE, DEXTROAMPHETAMINE SULFATE AND AMPHETAMINE SULFATE 2.5; 2.5; 2.5; 2.5 MG/1; MG/1; MG/1; MG/1
10 TABLET ORAL 2 TIMES DAILY
Qty: 60 TABLET | Refills: 0 | Status: SHIPPED | OUTPATIENT
Start: 2025-05-23 | End: 2025-05-27 | Stop reason: SDUPTHER

## 2025-04-22 RX ORDER — DEXTROAMPHETAMINE SACCHARATE, AMPHETAMINE ASPARTATE, DEXTROAMPHETAMINE SULFATE AND AMPHETAMINE SULFATE 2.5; 2.5; 2.5; 2.5 MG/1; MG/1; MG/1; MG/1
10 TABLET ORAL 2 TIMES DAILY
Qty: 60 TABLET | Refills: 0 | Status: SHIPPED | OUTPATIENT
Start: 2025-04-22 | End: 2025-05-22

## 2025-04-22 NOTE — TELEPHONE ENCOUNTER
Copied from CRM #[id number]. Topic: Medications - Medication Refill  >> Apr 22, 2025  8:49 AM Myriam wrote:  .Type:  RX Refill Request    Who Called: PT  Refill or New Rx:REFILL   RX Name and Strength:dextroamphetamine-amphetamine 10 mg Tab  How is the patient currently taking it? (ex. 1XDay):2/DAY  Is this a 30 day or 90 day RX:30  [facility]   Local or Mail Order:Local  Ordering Provider:Viktoria  Would the patient rather a call back or a response via [facility]?   Best Call Back Number:[phone]  Additional Information: dextroamphetamine-amphetamine 10 mg Tab

## 2025-05-26 PROBLEM — T14.90XA SPORTS INJURY: Status: ACTIVE | Noted: 2025-05-26

## 2025-05-26 NOTE — PROGRESS NOTES
Patient ID: Trey Slaughter is a 24 y.o. male.    Chief Complaint: Follow-up (3 month ADD - patient would like referral to PT for back pain. )    The patient location is:  Home  Visit type: audiovisual    Face to Face time with patient: 25 minutes  35 minutes of total time spent on the encounter, which includes face to face time and non-face to face time preparing to see the patient (eg, review of tests), Obtaining and/or reviewing separately obtained history, Documenting clinical information in the electronic or other health record, Independently interpreting results (not separately reported) and communicating results to the patient/family/caregiver, or Care coordination (not separately reported).     Each patient to whom he or she provides medical services by telemedicine is:  (1) informed of the relationship between the physician and patient and the respective role of any other health care provider with respect to management of the patient; and (2) notified that he or she may decline to receive medical services by telemedicine and may withdraw from such care at any time.         24-year-old male here today for a three-month medication follow up visit.  Medical comorbidities include anxiety , ADHD.  Also significant for intermittent lower back pain with recent MRI indicating multilevel spondylosis.     History of Present Illness    Patient presents today for ADHD medication refills.  Since last visit patient reports he has been fairly well without major illness.  He reports recent ankle and hip sprains and some weight loss of 15 lbs. He reports doing well on Adderall 10 mg instant release, which he prefers over extended release formulation.  Reports symptoms well managed.  Also propranolol 80 mg for anxiety, however does not take routinely.  He has a history of AC joint sprain with surgical repair several years ago and chronic disc problem in lower back.        Wellness:7/02/24    MEDICAL HISTORY:    Past  "Medical History:   Diagnosis Date    Anxiety state     Attention-deficit hyperactivity disorder, unspecified type     Hyperbilirubinemia     Low back pain     Pityriasis versicolor       Past Surgical History:   Procedure Laterality Date    JOINT REPLACEMENT  December of 2018    AC Joint    Shoulder joint operation N/A     TONSILLECTOMY      I dont remember when      Social History[1]       Health Maintenance Due   Topic Date Due    Hepatitis C Screening  Never done    HPV Vaccines (2 - Male 3-dose series) 06/12/2019    COVID-19 Vaccine (3 - 2024-25 season) 09/01/2024          Patient Care Team:  Shahida Blum MD as PCP - General (Internal Medicine)      Review of Systems   Constitutional:  Negative for activity change and unexpected weight change.   HENT:  Negative for hearing loss, rhinorrhea and trouble swallowing.    Eyes:  Negative for discharge and visual disturbance.   Respiratory:  Negative for chest tightness and wheezing.    Cardiovascular:  Negative for chest pain and palpitations.   Gastrointestinal:  Negative for blood in stool, constipation, diarrhea and vomiting.   Endocrine: Negative for polydipsia and polyuria.   Genitourinary:  Negative for difficulty urinating, hematuria and urgency.   Musculoskeletal:  Positive for arthralgias and back pain. Negative for joint swelling.   Neurological:  Negative for weakness and headaches.   Psychiatric/Behavioral:  Negative for confusion and dysphoric mood.        Objective:   Ht 5' 11" (1.803 m)   Wt 77.6 kg (171 lb)   BMI 23.85 kg/m²      Physical Exam      Assessment:       ICD-10-CM ICD-9-CM   1. Attention deficit hyperactivity disorder (ADHD), predominantly inattentive type  F90.0 314.00   2. Sports injury  T14.90XA 959.9   3. Acute midline low back pain without sciatica  M54.50 724.2   4. Anxiety state  F41.1 300.00        Plan:   1. Attention deficit hyperactivity disorder (ADHD), predominantly inattentive type  Assessment & Plan:  -stable "   -currently on Adderall 10 mg b.i.d., continue  -avoid excessive caffeine or energy drink use while on ADHD medication   -notify clinic for new or worsening symptoms   -follow-up q.3 months for refills       Orders:  -     dextroamphetamine-amphetamine (ADDERALL) 10 mg Tab; Take 1 tablet (10 mg total) by mouth 2 (two) times a day.  Dispense: 60 tablet; Refill: 0  -     dextroamphetamine-amphetamine (ADDERALL) 10 mg Tab; Take 1 tablet (10 mg total) by mouth 2 (two) times a day.  Dispense: 60 tablet; Refill: 0  -     dextroamphetamine-amphetamine (ADDERALL) 10 mg Tab; Take 1 tablet (10 mg total) by mouth 2 (two) times a day.  Dispense: 60 tablet; Refill: 0    2. Sports injury  Assessment & Plan:  -recent ankle sprain   -referred to physical therapy   -okay utilize OTC NSAIDs for discomfort   -rest, ice, mild compression, and elevate elevate extremity      Orders:  -     Ambulatory Referral/Consult to Physical Therapy; Future; Expected date: 04/29/2025    3. Acute midline low back pain without sciatica  Assessment & Plan:  -mild persistent   -referral to physical therapy    Orders:  -     Ambulatory Referral/Consult to Physical Therapy; Future; Expected date: 04/29/2025    4. Anxiety state  Assessment & Plan:  -stable   -currently on propranolol 80 mg daily, continue  -avoid excessive caffeine or energy drink use            Follow up for Previously scheduled and PRN if need.   -plan specifics discussed above    Orders Placed This Encounter    Ambulatory Referral/Consult to Physical Therapy    dextroamphetamine-amphetamine (ADDERALL) 10 mg Tab    dextroamphetamine-amphetamine (ADDERALL) 10 mg Tab    dextroamphetamine-amphetamine (ADDERALL) 10 mg Tab        Medication List with Changes/Refills   New Medications    DEXTROAMPHETAMINE-AMPHETAMINE (ADDERALL) 10 MG TAB    Take 1 tablet (10 mg total) by mouth 2 (two) times a day.    DEXTROAMPHETAMINE-AMPHETAMINE (ADDERALL) 10 MG TAB    Take 1 tablet (10 mg total) by mouth 2  (two) times a day.    DEXTROAMPHETAMINE-AMPHETAMINE (ADDERALL) 10 MG TAB    Take 1 tablet (10 mg total) by mouth 2 (two) times a day.   Current Medications    PROPRANOLOL (INDERAL LA) 80 MG 24 HR CAPSULE    TAKE 1 CAPSULE BY MOUTH ONCE DAILY.   Discontinued Medications    DEXTROAMPHETAMINE-AMPHETAMINE (ADDERALL) 10 MG TAB    Take 1 tablet (10 mg total) by mouth 2 (two) times a day.    DEXTROAMPHETAMINE-AMPHETAMINE (ADDERALL) 10 MG TAB    Take 1 tablet (10 mg total) by mouth 2 (two) times a day.    DEXTROAMPHETAMINE-AMPHETAMINE (ADDERALL) 10 MG TAB    Take 1 tablet (10 mg total) by mouth 2 (two) times a day.    DEXTROAMPHETAMINE-AMPHETAMINE (ADDERALL) 10 MG TAB    Take 1 tablet (10 mg total) by mouth 2 (two) times a day.    DEXTROAMPHETAMINE-AMPHETAMINE (ADDERALL) 10 MG TAB    Take 1 tablet (10 mg total) by mouth 2 (two) times a day.    DEXTROAMPHETAMINE-AMPHETAMINE (ADDERALL) 10 MG TAB    Take 1 tablet (10 mg total) by mouth 2 (two) times a day.    DEXTROAMPHETAMINE-AMPHETAMINE (ADDERALL) 10 MG TAB    Take 1 tablet (10 mg total) by mouth 2 (two) times a day.    DEXTROAMPHETAMINE-AMPHETAMINE 10 MG TAB    Take 1 tablet (10 mg total) by mouth 2 (two) times a day.      This note was generated with the assistance of Access Closure listening technology. I attest to having reviewed and edited the generated note for accuracy, though some syntax or spelling errors may persist. Please contact the author of this note for any clarification.          [1]   Social History  Tobacco Use    Smoking status: Never    Smokeless tobacco: Never   Substance Use Topics    Alcohol use: Never    Drug use: Not Currently

## 2025-05-26 NOTE — ASSESSMENT & PLAN NOTE
-recent ankle sprain   -referred to physical therapy   -okay utilize OTC NSAIDs for discomfort   -rest, ice, mild compression, and elevate elevate extremity

## 2025-05-27 DIAGNOSIS — F90.0 ATTENTION DEFICIT HYPERACTIVITY DISORDER (ADHD), PREDOMINANTLY INATTENTIVE TYPE: ICD-10-CM

## 2025-05-27 RX ORDER — DEXTROAMPHETAMINE SACCHARATE, AMPHETAMINE ASPARTATE, DEXTROAMPHETAMINE SULFATE AND AMPHETAMINE SULFATE 2.5; 2.5; 2.5; 2.5 MG/1; MG/1; MG/1; MG/1
10 TABLET ORAL 2 TIMES DAILY
Qty: 60 TABLET | Refills: 0 | Status: SHIPPED | OUTPATIENT
Start: 2025-05-27 | End: 2025-06-26

## 2025-05-27 NOTE — TELEPHONE ENCOUNTER
Copied from CRM #[id number]. Topic: Medications - Medication Refill  >> May 27, 2025 10:22 AM Majo wrote:  Who Called: Trey Arrieta Slaughter    Refill or New Rx:Refill  RX Name and Strength:dextroamphetamine-amphetamine (ADDERALL) 10 mg Tab  How is the patient currently taking it? (ex. 1XDay): Take 1 tablet (10 mg total) by mouth 2 (two) times a day. - Oral  Is this a 30 day or 90 day RX:60  Local or Mail Order:local   List of preferred pharmacies on file (remove unneeded): [id number] [place] [facility] [address] [id number]  Ordering Provider:Dilan Saldana FNP      Preferred Method of Contact: Phone Call  Patient's Preferred Phone Number on File: [phone]   Best Call Back Number, if different:  Additional Information:

## 2025-06-25 DIAGNOSIS — Z13.83 SCREENING FOR CARDIOVASCULAR, RESPIRATORY, AND GENITOURINARY DISEASES: ICD-10-CM

## 2025-06-25 DIAGNOSIS — Z79.899 ENCOUNTER FOR LONG-TERM CURRENT USE OF MEDICATION: ICD-10-CM

## 2025-06-25 DIAGNOSIS — Z13.6 SCREENING FOR CARDIOVASCULAR, RESPIRATORY, AND GENITOURINARY DISEASES: ICD-10-CM

## 2025-06-25 DIAGNOSIS — Z13.89 SCREENING FOR CARDIOVASCULAR, RESPIRATORY, AND GENITOURINARY DISEASES: ICD-10-CM

## 2025-06-25 DIAGNOSIS — R03.0 PREHYPERTENSION: ICD-10-CM

## 2025-06-25 DIAGNOSIS — E56.9 VITAMIN DEFICIENCY: ICD-10-CM

## 2025-06-25 DIAGNOSIS — Z00.00 WELLNESS EXAMINATION: Primary | ICD-10-CM

## 2025-06-25 DIAGNOSIS — Z13.0 SCREENING FOR ENDOCRINE, NUTRITIONAL, METABOLIC AND IMMUNITY DISORDER: ICD-10-CM

## 2025-06-25 DIAGNOSIS — Z13.21 SCREENING FOR ENDOCRINE, NUTRITIONAL, METABOLIC AND IMMUNITY DISORDER: ICD-10-CM

## 2025-06-25 DIAGNOSIS — Z13.29 SCREENING FOR ENDOCRINE, NUTRITIONAL, METABOLIC AND IMMUNITY DISORDER: ICD-10-CM

## 2025-06-25 DIAGNOSIS — Z13.228 SCREENING FOR ENDOCRINE, NUTRITIONAL, METABOLIC AND IMMUNITY DISORDER: ICD-10-CM

## 2025-06-26 ENCOUNTER — TELEPHONE (OUTPATIENT)
Dept: INTERNAL MEDICINE | Facility: CLINIC | Age: 25
End: 2025-06-26
Payer: COMMERCIAL

## 2025-06-26 NOTE — TELEPHONE ENCOUNTER
----- Message from Med Assistant Cruz sent at 6/25/2025  8:49 AM CDT -----  Regarding: PV Thursday 7-3-25  Wellness Appointment    Fasting wellness labs ordered and ready to do.     Last Wellness 7-2-24

## 2025-06-27 DIAGNOSIS — F90.0 ATTENTION DEFICIT HYPERACTIVITY DISORDER (ADHD), PREDOMINANTLY INATTENTIVE TYPE: ICD-10-CM

## 2025-06-27 RX ORDER — DEXTROAMPHETAMINE SACCHARATE, AMPHETAMINE ASPARTATE, DEXTROAMPHETAMINE SULFATE AND AMPHETAMINE SULFATE 2.5; 2.5; 2.5; 2.5 MG/1; MG/1; MG/1; MG/1
10 TABLET ORAL 2 TIMES DAILY
Qty: 60 TABLET | Refills: 0 | Status: SHIPPED | OUTPATIENT
Start: 2025-06-27 | End: 2025-07-27

## 2025-06-27 NOTE — TELEPHONE ENCOUNTER
Copied from CRM #[id number]. Topic: Medications - Medication Refill  >> Jun 27, 2025  9:12 AM Rik wrote:  .Who Called: Trey Arrieta Slaughter    Refill or New Rx:Refill  RX Name and Strength:  dextroamphetamine-amphetamine (ADDERALL) 10 mg Tab    How is the patient currently taking it? (ex. 1XDay):2x  Is this a 30 day or 90 day RX:30  Local or Mail Order:local   List of preferred pharmacies on file (remove unneeded): [unfilled]  If different Pharmacy is requested, enter Pharmacy information here including location and phone number: [facility]    Ordering Provider:greg       Preferred Method of Contact: Phone Call  Patient's Preferred Phone Number on File: [phone]   Best Call Back Number, if different:  Additional Information:

## 2025-07-02 ENCOUNTER — LAB VISIT (OUTPATIENT)
Dept: LAB | Facility: HOSPITAL | Age: 25
End: 2025-07-02
Attending: INTERNAL MEDICINE
Payer: COMMERCIAL

## 2025-07-02 DIAGNOSIS — Z13.83 SCREENING FOR CARDIOVASCULAR, RESPIRATORY, AND GENITOURINARY DISEASES: ICD-10-CM

## 2025-07-02 DIAGNOSIS — Z13.228 SCREENING FOR ENDOCRINE, NUTRITIONAL, METABOLIC AND IMMUNITY DISORDER: ICD-10-CM

## 2025-07-02 DIAGNOSIS — Z00.00 WELLNESS EXAMINATION: ICD-10-CM

## 2025-07-02 DIAGNOSIS — Z13.21 SCREENING FOR ENDOCRINE, NUTRITIONAL, METABOLIC AND IMMUNITY DISORDER: ICD-10-CM

## 2025-07-02 DIAGNOSIS — Z13.6 SCREENING FOR CARDIOVASCULAR, RESPIRATORY, AND GENITOURINARY DISEASES: ICD-10-CM

## 2025-07-02 DIAGNOSIS — E56.9 VITAMIN DEFICIENCY: ICD-10-CM

## 2025-07-02 DIAGNOSIS — Z13.0 SCREENING FOR ENDOCRINE, NUTRITIONAL, METABOLIC AND IMMUNITY DISORDER: ICD-10-CM

## 2025-07-02 DIAGNOSIS — Z13.89 SCREENING FOR CARDIOVASCULAR, RESPIRATORY, AND GENITOURINARY DISEASES: ICD-10-CM

## 2025-07-02 DIAGNOSIS — Z79.899 ENCOUNTER FOR LONG-TERM CURRENT USE OF MEDICATION: ICD-10-CM

## 2025-07-02 DIAGNOSIS — Z13.29 SCREENING FOR ENDOCRINE, NUTRITIONAL, METABOLIC AND IMMUNITY DISORDER: ICD-10-CM

## 2025-07-02 DIAGNOSIS — R03.0 PREHYPERTENSION: ICD-10-CM

## 2025-07-02 LAB
25(OH)D3+25(OH)D2 SERPL-MCNC: 26 NG/ML (ref 30–80)
ALBUMIN SERPL-MCNC: 4.4 G/DL (ref 3.5–5)
ALBUMIN/GLOB SERPL: 1.8 RATIO (ref 1.1–2)
ALP SERPL-CCNC: 76 UNIT/L (ref 40–150)
ALT SERPL-CCNC: 12 UNIT/L (ref 0–55)
ANION GAP SERPL CALC-SCNC: 5 MEQ/L
AST SERPL-CCNC: 15 UNIT/L (ref 11–45)
BASOPHILS # BLD AUTO: 0.02 X10(3)/MCL
BASOPHILS NFR BLD AUTO: 0.3 %
BILIRUB SERPL-MCNC: 1.9 MG/DL
BUN SERPL-MCNC: 16.1 MG/DL (ref 8.9–20.6)
CALCIUM SERPL-MCNC: 9.1 MG/DL (ref 8.4–10.2)
CHLORIDE SERPL-SCNC: 107 MMOL/L (ref 98–107)
CHOLEST SERPL-MCNC: 108 MG/DL
CHOLEST/HDLC SERPL: 3 {RATIO} (ref 0–5)
CO2 SERPL-SCNC: 28 MMOL/L (ref 22–29)
CREAT SERPL-MCNC: 0.79 MG/DL (ref 0.72–1.25)
CREAT/UREA NIT SERPL: 20
EOSINOPHIL # BLD AUTO: 0.2 X10(3)/MCL (ref 0–0.9)
EOSINOPHIL NFR BLD AUTO: 3 %
ERYTHROCYTE [DISTWIDTH] IN BLOOD BY AUTOMATED COUNT: 11.6 % (ref 11.5–17)
EST. AVERAGE GLUCOSE BLD GHB EST-MCNC: 99.7 MG/DL
GFR SERPLBLD CREATININE-BSD FMLA CKD-EPI: >60 ML/MIN/1.73/M2
GLOBULIN SER-MCNC: 2.4 GM/DL (ref 2.4–3.5)
GLUCOSE SERPL-MCNC: 94 MG/DL (ref 74–100)
HBA1C MFR BLD: 5.1 %
HCT VFR BLD AUTO: 44.3 % (ref 42–52)
HDLC SERPL-MCNC: 39 MG/DL (ref 35–60)
HGB BLD-MCNC: 15 G/DL (ref 14–18)
IMM GRANULOCYTES # BLD AUTO: 0.01 X10(3)/MCL (ref 0–0.04)
IMM GRANULOCYTES NFR BLD AUTO: 0.1 %
LDLC SERPL CALC-MCNC: 60 MG/DL (ref 50–140)
LYMPHOCYTES # BLD AUTO: 1.86 X10(3)/MCL (ref 0.6–4.6)
LYMPHOCYTES NFR BLD AUTO: 27.5 %
MCH RBC QN AUTO: 30.1 PG (ref 27–31)
MCHC RBC AUTO-ENTMCNC: 33.9 G/DL (ref 33–36)
MCV RBC AUTO: 88.8 FL (ref 80–94)
MONOCYTES # BLD AUTO: 0.49 X10(3)/MCL (ref 0.1–1.3)
MONOCYTES NFR BLD AUTO: 7.2 %
NEUTROPHILS # BLD AUTO: 4.19 X10(3)/MCL (ref 2.1–9.2)
NEUTROPHILS NFR BLD AUTO: 61.9 %
NRBC BLD AUTO-RTO: 0 %
PLATELET # BLD AUTO: 186 X10(3)/MCL (ref 130–400)
PMV BLD AUTO: 10.1 FL (ref 7.4–10.4)
POTASSIUM SERPL-SCNC: 4 MMOL/L (ref 3.5–5.1)
PROT SERPL-MCNC: 6.8 GM/DL (ref 6.4–8.3)
RBC # BLD AUTO: 4.99 X10(6)/MCL (ref 4.7–6.1)
SODIUM SERPL-SCNC: 140 MMOL/L (ref 136–145)
TRIGL SERPL-MCNC: 46 MG/DL (ref 34–140)
TSH SERPL-ACNC: 2.32 UIU/ML (ref 0.35–4.94)
VLDLC SERPL CALC-MCNC: 9 MG/DL
WBC # BLD AUTO: 6.77 X10(3)/MCL (ref 4.5–11.5)

## 2025-07-02 PROCEDURE — 80053 COMPREHEN METABOLIC PANEL: CPT

## 2025-07-02 PROCEDURE — 80061 LIPID PANEL: CPT

## 2025-07-02 PROCEDURE — 83036 HEMOGLOBIN GLYCOSYLATED A1C: CPT

## 2025-07-02 PROCEDURE — 36415 COLL VENOUS BLD VENIPUNCTURE: CPT

## 2025-07-02 PROCEDURE — 84443 ASSAY THYROID STIM HORMONE: CPT

## 2025-07-02 PROCEDURE — 85025 COMPLETE CBC W/AUTO DIFF WBC: CPT

## 2025-07-02 PROCEDURE — 82306 VITAMIN D 25 HYDROXY: CPT

## 2025-07-03 ENCOUNTER — OFFICE VISIT (OUTPATIENT)
Dept: INTERNAL MEDICINE | Facility: CLINIC | Age: 25
End: 2025-07-03
Payer: COMMERCIAL

## 2025-07-03 VITALS
HEART RATE: 72 BPM | WEIGHT: 171 LBS | OXYGEN SATURATION: 98 % | SYSTOLIC BLOOD PRESSURE: 122 MMHG | DIASTOLIC BLOOD PRESSURE: 64 MMHG | HEIGHT: 71 IN | BODY MASS INDEX: 23.94 KG/M2

## 2025-07-03 DIAGNOSIS — F90.0 ATTENTION DEFICIT HYPERACTIVITY DISORDER (ADHD), PREDOMINANTLY INATTENTIVE TYPE: Primary | Chronic | ICD-10-CM

## 2025-07-03 DIAGNOSIS — F41.1 ANXIETY STATE: Chronic | ICD-10-CM

## 2025-07-03 PROCEDURE — 3078F DIAST BP <80 MM HG: CPT | Mod: CPTII,,,

## 2025-07-03 PROCEDURE — 3008F BODY MASS INDEX DOCD: CPT | Mod: CPTII,,,

## 2025-07-03 PROCEDURE — 1159F MED LIST DOCD IN RCRD: CPT | Mod: CPTII,,,

## 2025-07-03 PROCEDURE — 3044F HG A1C LEVEL LT 7.0%: CPT | Mod: CPTII,,,

## 2025-07-03 PROCEDURE — 3074F SYST BP LT 130 MM HG: CPT | Mod: CPTII,,,

## 2025-07-03 PROCEDURE — 99214 OFFICE O/P EST MOD 30 MIN: CPT | Mod: ,,,

## 2025-07-03 NOTE — PROGRESS NOTES
Patient ID: Trey Slaughter is a 24 y.o. male.    Chief Complaint: Annual Exam (Annual Wellness exam- no complaints or concerns today. )    Trey Slaughter is a 24 y.o. male, known to Dr Blum, presents today for a wellness visit.  Medical comorbidities include anxiety , ADHD. Also significant for intermittent lower back pain with prior MRI indicating multilevel spondylosis.     History of Present Illness    Patient presents today for follow up. He reports acute back pain following disc injury sustained after using an inversion table. He describes experiencing a disc flare-up after attempting to decompress his spine post-judo, without proper warm-up or core bracing. He was unable to walk for three days following the incident, with mobility returning on the fourth day. He acknowledges performing the inversion technique incorrectly and recognizes the potential risks of improper spinal decompression. Currently, he denies active back pain or mobility limitations. Vitamin D level low at 26, below normal range of 30-80. Hemoglobin A1C stable at 5.1. Thyroid within normal limits. Blood count stable. Mildly elevated bilirubin, noted as chronic and not concerning. He takes multiple supplements for health benefits. He verbalizes reluctance about taking supplements but continues to do so due to perceived positive health impacts. He is open to potentially adding another supplement to his current regimen.        Wellness:  Today 07/03/2025    MEDICAL HISTORY:    Past Medical History:   Diagnosis Date    Anxiety state     Attention-deficit hyperactivity disorder, unspecified type     Hyperbilirubinemia     Low back pain     Pityriasis versicolor       Past Surgical History:   Procedure Laterality Date    JOINT REPLACEMENT  December of 2018    AC Joint    Shoulder joint operation N/A     TONSILLECTOMY      I dont remember when      Social History[1]       Health Maintenance Due   Topic Date Due    Hepatitis C  "Screening  Never done    HPV Vaccines (2 - Male 3-dose series) 06/12/2019    COVID-19 Vaccine (3 - 2024-25 season) 09/01/2024          Patient Care Team:  Shahida Blum MD as PCP - General (Internal Medicine)      Review of Systems   Constitutional:  Negative for fatigue and fever.   HENT:  Negative for congestion, rhinorrhea, sore throat and trouble swallowing.    Eyes:  Negative for redness and visual disturbance.   Respiratory:  Negative for cough, chest tightness and shortness of breath.    Cardiovascular:  Negative for chest pain and palpitations.   Gastrointestinal:  Negative for abdominal pain, constipation, diarrhea, nausea and vomiting.   Genitourinary:  Negative for dysuria, flank pain, frequency and urgency.   Musculoskeletal:  Negative for arthralgias, gait problem and myalgias.   Skin:  Negative for rash and wound.   Neurological:  Negative for facial asymmetry, speech difficulty, weakness and headaches.   All other systems reviewed and are negative.      Objective:   /64 (BP Location: Right arm, Patient Position: Sitting)   Pulse 72   Ht 5' 11" (1.803 m)   Wt 77.6 kg (171 lb)   SpO2 98%   BMI 23.85 kg/m²      Physical Exam  Constitutional:       General: He is not in acute distress.     Appearance: Normal appearance.   HENT:      Right Ear: Tympanic membrane, ear canal and external ear normal.      Left Ear: Tympanic membrane, ear canal and external ear normal.      Nose: Nose normal.      Mouth/Throat:      Mouth: Mucous membranes are moist.      Pharynx: Oropharynx is clear.   Eyes:      Extraocular Movements: Extraocular movements intact.      Conjunctiva/sclera: Conjunctivae normal.      Pupils: Pupils are equal, round, and reactive to light.   Cardiovascular:      Rate and Rhythm: Normal rate and regular rhythm.      Pulses: Normal pulses.      Heart sounds: Normal heart sounds. No murmur heard.     No gallop.   Pulmonary:      Effort: Pulmonary effort is normal.      Breath " sounds: Normal breath sounds. No wheezing.   Abdominal:      General: Bowel sounds are normal. There is no distension.      Palpations: Abdomen is soft. There is no mass.      Tenderness: There is no abdominal tenderness. There is no guarding.   Musculoskeletal:         General: Normal range of motion.   Skin:     General: Skin is warm and dry.   Neurological:      Mental Status: He is alert. Mental status is at baseline.      Sensory: No sensory deficit.      Motor: No weakness.           Assessment:       ICD-10-CM ICD-9-CM   1. Attention deficit hyperactivity disorder (ADHD), predominantly inattentive type  F90.0 314.00   2. Anxiety state  F41.1 300.00        Plan:   1. Attention deficit hyperactivity disorder (ADHD), predominantly inattentive type  Assessment & Plan:  -stable   -currently on Adderall 10 mg b.i.d., continue  -avoid excessive caffeine or energy drink use while on ADHD medication   -notify clinic for new or worsening symptoms   -follow-up q.3 months for refills         2. Anxiety state  Assessment & Plan:  -stable   -currently on propranolol 80 mg daily, continue  -avoid excessive caffeine or energy drink use                Follow up in about 3 months (around 10/3/2025) for ADD, with Dr. Blum.   -plan specifics discussed above          Medication List with Changes/Refills   Current Medications    DEXTROAMPHETAMINE-AMPHETAMINE (ADDERALL) 10 MG TAB    Take 1 tablet (10 mg total) by mouth 2 (two) times a day.    PROPRANOLOL (INDERAL LA) 80 MG 24 HR CAPSULE    TAKE 1 CAPSULE BY MOUTH ONCE DAILY.   Discontinued Medications    DEXTROAMPHETAMINE-AMPHETAMINE (ADDERALL) 10 MG TAB    Take 1 tablet (10 mg total) by mouth 2 (two) times a day.      This note was generated with the assistance of [employer] listening technology. I attest to having reviewed and edited the generated note for accuracy, though some syntax or spelling errors may persist. Please contact the author of this note for any clarification.           [1]   Social History  Tobacco Use    Smoking status: Never    Smokeless tobacco: Never   Substance Use Topics    Alcohol use: Never    Drug use: Not Currently

## 2025-07-04 DIAGNOSIS — F41.1 ANXIETY STATE: ICD-10-CM

## 2025-07-07 RX ORDER — PROPRANOLOL HYDROCHLORIDE 80 MG/1
80 CAPSULE, EXTENDED RELEASE ORAL
Qty: 90 CAPSULE | Refills: 1 | Status: SHIPPED | OUTPATIENT
Start: 2025-07-07

## 2025-07-19 ENCOUNTER — HOSPITAL ENCOUNTER (EMERGENCY)
Facility: HOSPITAL | Age: 25
Discharge: HOME | End: 2025-07-19
Attending: STUDENT IN AN ORGANIZED HEALTH CARE EDUCATION/TRAINING PROGRAM
Payer: COMMERCIAL

## 2025-07-19 VITALS
RESPIRATION RATE: 18 BRPM | BODY MASS INDEX: 23.1 KG/M2 | HEART RATE: 74 BPM | DIASTOLIC BLOOD PRESSURE: 63 MMHG | SYSTOLIC BLOOD PRESSURE: 118 MMHG | HEIGHT: 71 IN | WEIGHT: 165 LBS | OXYGEN SATURATION: 98 % | TEMPERATURE: 100 F

## 2025-07-19 DIAGNOSIS — U07.1 COVID-19: ICD-10-CM

## 2025-07-19 DIAGNOSIS — R50.9 FEVER, UNSPECIFIED FEVER CAUSE: ICD-10-CM

## 2025-07-19 DIAGNOSIS — M79.10 MYALGIA: Primary | ICD-10-CM

## 2025-07-19 LAB
FLUAV AG UPPER RESP QL IA.RAPID: NOT DETECTED
FLUBV AG UPPER RESP QL IA.RAPID: NOT DETECTED
SARS-COV-2 RNA RESP QL NAA+PROBE: DETECTED

## 2025-07-19 PROCEDURE — 87636 SARSCOV2 & INF A&B AMP PRB: CPT | Performed by: STUDENT IN AN ORGANIZED HEALTH CARE EDUCATION/TRAINING PROGRAM

## 2025-07-19 PROCEDURE — 99284 EMERGENCY DEPT VISIT MOD MDM: CPT

## 2025-07-19 RX ORDER — IBUPROFEN 800 MG/1
800 TABLET, FILM COATED ORAL EVERY 8 HOURS PRN
Qty: 15 TABLET | Refills: 0 | Status: SHIPPED | OUTPATIENT
Start: 2025-07-19 | End: 2025-07-24

## 2025-07-19 RX ORDER — ONDANSETRON 4 MG/1
4 TABLET, ORALLY DISINTEGRATING ORAL EVERY 8 HOURS PRN
Qty: 15 TABLET | Refills: 0 | Status: SHIPPED | OUTPATIENT
Start: 2025-07-19 | End: 2025-07-24

## 2025-07-19 RX ORDER — METHOCARBAMOL 500 MG/1
500 TABLET, FILM COATED ORAL 3 TIMES DAILY
Qty: 30 TABLET | Refills: 0 | Status: SHIPPED | OUTPATIENT
Start: 2025-07-19 | End: 2025-07-29

## 2025-07-19 NOTE — Clinical Note
"Trey Colby" Slaughter was seen and treated in our emergency department on 7/19/2025.  He may return to work on 07/21/2025.       If you have any questions or concerns, please don't hesitate to call.      Ke Her RN    "

## 2025-07-19 NOTE — DISCHARGE INSTRUCTIONS
Follow-up with the primary care physician.      You may take muscle relaxer and anti-inflammatory as prescribed.      You may take Zofran for any nausea.      You may take molnupiravir as prescribed.     Return to the emergency department if any new or worsening symptoms.

## 2025-07-29 DIAGNOSIS — F90.0 ATTENTION DEFICIT HYPERACTIVITY DISORDER (ADHD), PREDOMINANTLY INATTENTIVE TYPE: Primary | ICD-10-CM

## 2025-07-29 NOTE — TELEPHONE ENCOUNTER
Copied from CRM #[id number]. Topic: Medications - Medication Refill  >> Jul 29, 2025  1:41 PM Lauro wrote:  .Who Called: Trey Slaughter    Refill or New Rx:New Rx    RX Name and Strength: dextroamphetamine-amphetamine (ADDERALL) 10 mg Tab    How is the patient currently taking it? (ex. 1XDay): Sig - Route: Take 1 tablet (10 mg total) by mouth 2 (two) times a day. - Oral    Is this a 30 day or 90 day RX: 60    Local or Mail Order: Local     List of preferred pharmacies on file (remove unneeded): [facility] #[id number] - [place], [place] - [address]   Phone: [phone]  Fax: [phone]    Ordering Provider: Dr. Blum    Preferred Method of Contact: Phone Call    Patient's Preferred Phone Number on File: [phone]     Best Call Back Number, if different:    Additional Information:  N/A

## 2025-07-30 RX ORDER — DEXTROAMPHETAMINE SACCHARATE, AMPHETAMINE ASPARTATE, DEXTROAMPHETAMINE SULFATE AND AMPHETAMINE SULFATE 2.5; 2.5; 2.5; 2.5 MG/1; MG/1; MG/1; MG/1
10 TABLET ORAL 2 TIMES DAILY
Qty: 60 TABLET | Refills: 0 | Status: SHIPPED | OUTPATIENT
Start: 2025-07-30

## 2025-09-03 DIAGNOSIS — F90.0 ATTENTION DEFICIT HYPERACTIVITY DISORDER (ADHD), PREDOMINANTLY INATTENTIVE TYPE: ICD-10-CM

## 2025-09-03 RX ORDER — DEXTROAMPHETAMINE SACCHARATE, AMPHETAMINE ASPARTATE, DEXTROAMPHETAMINE SULFATE AND AMPHETAMINE SULFATE 2.5; 2.5; 2.5; 2.5 MG/1; MG/1; MG/1; MG/1
10 TABLET ORAL 2 TIMES DAILY
Qty: 60 TABLET | Refills: 0 | Status: SHIPPED | OUTPATIENT
Start: 2025-09-03